# Patient Record
Sex: FEMALE | Race: WHITE | NOT HISPANIC OR LATINO | Employment: FULL TIME | URBAN - METROPOLITAN AREA
[De-identification: names, ages, dates, MRNs, and addresses within clinical notes are randomized per-mention and may not be internally consistent; named-entity substitution may affect disease eponyms.]

---

## 2019-01-06 ENCOUNTER — HOSPITAL ENCOUNTER (OUTPATIENT)
Dept: URGENT CARE | Facility: CLINIC | Age: 24
Discharge: HOME | End: 2019-01-06

## 2019-01-08 LAB — BACTERIA SPEC AEROBE CULT: NORMAL

## 2019-12-27 ENCOUNTER — HOSPITAL ENCOUNTER (OUTPATIENT)
Dept: URGENT CARE | Facility: CLINIC | Age: 24
Discharge: HOME | End: 2019-12-27
Attending: NURSE PRACTITIONER

## 2020-03-14 ENCOUNTER — HOSPITAL ENCOUNTER (OUTPATIENT)
Dept: URGENT CARE | Facility: CLINIC | Age: 25
Discharge: HOME | End: 2020-03-14

## 2020-03-16 LAB
BACTERIA SPEC AEROBE CULT: NORMAL
BACTERIA UR CULT: NORMAL

## 2020-07-20 ENCOUNTER — HOSPITAL ENCOUNTER (OUTPATIENT)
Dept: URGENT CARE | Facility: CLINIC | Age: 25
Discharge: HOME | End: 2020-07-20
Attending: PHYSICIAN ASSISTANT

## 2020-07-21 LAB — SARS-COV-2 RNA SPEC QL NAA+PROBE: NOT DETECTED

## 2020-12-28 ENCOUNTER — HOSPITAL ENCOUNTER (OUTPATIENT)
Dept: URGENT CARE | Facility: CLINIC | Age: 25
Discharge: HOME | End: 2020-12-28

## 2020-12-30 LAB — SARS-COV-2 RNA SPEC QL NAA+PROBE: NOT DETECTED

## 2020-12-31 LAB — BACTERIA SPEC AEROBE CULT: NORMAL

## 2021-06-16 ENCOUNTER — TRANSCRIBE ORDERS (OUTPATIENT)
Dept: ADMINISTRATIVE | Facility: HOSPITAL | Age: 26
End: 2021-06-16

## 2021-06-16 DIAGNOSIS — Z36.87 ENCOUNTER FOR ANTENATAL SCREENING FOR UNCERTAIN DATES: Primary | ICD-10-CM

## 2021-06-28 ENCOUNTER — APPOINTMENT (OUTPATIENT)
Dept: ULTRASOUND IMAGING | Facility: HOSPITAL | Age: 26
End: 2021-06-28

## 2022-03-12 PROCEDURE — U0004 COV-19 TEST NON-CDC HGH THRU: HCPCS | Performed by: EMERGENCY MEDICINE

## 2022-07-12 LAB
ALBUMIN SERPL-MCNC: 5.3 G/DL (ref 3.5–5.2)
ALBUMIN/GLOB SERPL: 1.8 G/DL
ALP SERPL-CCNC: 71 U/L (ref 39–117)
ALT SERPL W P-5'-P-CCNC: 22 U/L (ref 1–33)
ANION GAP SERPL CALCULATED.3IONS-SCNC: 12.8 MMOL/L (ref 5–15)
AST SERPL-CCNC: 18 U/L (ref 1–32)
BASOPHILS # BLD AUTO: 0.02 10*3/MM3 (ref 0–0.2)
BASOPHILS NFR BLD AUTO: 0.3 % (ref 0–1.5)
BILIRUB SERPL-MCNC: 1.5 MG/DL (ref 0–1.2)
BUN SERPL-MCNC: 15 MG/DL (ref 6–20)
BUN/CREAT SERPL: 18.1 (ref 7–25)
CALCIUM SPEC-SCNC: 10.1 MG/DL (ref 8.6–10.5)
CHLORIDE SERPL-SCNC: 99 MMOL/L (ref 98–107)
CO2 SERPL-SCNC: 24.2 MMOL/L (ref 22–29)
CREAT SERPL-MCNC: 0.83 MG/DL (ref 0.57–1)
DEPRECATED RDW RBC AUTO: 44.6 FL (ref 37–54)
EGFRCR SERPLBLD CKD-EPI 2021: 99.9 ML/MIN/1.73
EOSINOPHIL # BLD AUTO: 0.01 10*3/MM3 (ref 0–0.4)
EOSINOPHIL NFR BLD AUTO: 0.1 % (ref 0.3–6.2)
ERYTHROCYTE [DISTWIDTH] IN BLOOD BY AUTOMATED COUNT: 13.6 % (ref 12.3–15.4)
GLOBULIN UR ELPH-MCNC: 3 GM/DL
GLUCOSE SERPL-MCNC: 125 MG/DL (ref 65–99)
HCG INTACT+B SERPL-ACNC: <0.5 MIU/ML
HCT VFR BLD AUTO: 44.8 % (ref 34–46.6)
HGB BLD-MCNC: 15.1 G/DL (ref 12–15.9)
HOLD SPECIMEN: NORMAL
HOLD SPECIMEN: NORMAL
IMM GRANULOCYTES # BLD AUTO: 0.02 10*3/MM3 (ref 0–0.05)
IMM GRANULOCYTES NFR BLD AUTO: 0.3 % (ref 0–0.5)
LIPASE SERPL-CCNC: 20 U/L (ref 13–60)
LYMPHOCYTES # BLD AUTO: 0.82 10*3/MM3 (ref 0.7–3.1)
LYMPHOCYTES NFR BLD AUTO: 11.4 % (ref 19.6–45.3)
MCH RBC QN AUTO: 30 PG (ref 26.6–33)
MCHC RBC AUTO-ENTMCNC: 33.7 G/DL (ref 31.5–35.7)
MCV RBC AUTO: 89.1 FL (ref 79–97)
MONOCYTES # BLD AUTO: 0.76 10*3/MM3 (ref 0.1–0.9)
MONOCYTES NFR BLD AUTO: 10.5 % (ref 5–12)
NEUTROPHILS NFR BLD AUTO: 5.58 10*3/MM3 (ref 1.7–7)
NEUTROPHILS NFR BLD AUTO: 77.4 % (ref 42.7–76)
NRBC BLD AUTO-RTO: 0 /100 WBC (ref 0–0.2)
PLATELET # BLD AUTO: 170 10*3/MM3 (ref 140–450)
PMV BLD AUTO: 11.8 FL (ref 6–12)
POTASSIUM SERPL-SCNC: 3.5 MMOL/L (ref 3.5–5.2)
PROT SERPL-MCNC: 8.3 G/DL (ref 6–8.5)
RBC # BLD AUTO: 5.03 10*6/MM3 (ref 3.77–5.28)
SODIUM SERPL-SCNC: 136 MMOL/L (ref 136–145)
WBC NRBC COR # BLD: 7.21 10*3/MM3 (ref 3.4–10.8)
WHOLE BLOOD HOLD COAG: NORMAL
WHOLE BLOOD HOLD SPECIMEN: NORMAL

## 2022-07-12 PROCEDURE — 83690 ASSAY OF LIPASE: CPT

## 2022-07-12 PROCEDURE — 96372 THER/PROPH/DIAG INJ SC/IM: CPT

## 2022-07-12 PROCEDURE — 96374 THER/PROPH/DIAG INJ IV PUSH: CPT

## 2022-07-12 PROCEDURE — 84702 CHORIONIC GONADOTROPIN TEST: CPT

## 2022-07-12 PROCEDURE — 36415 COLL VENOUS BLD VENIPUNCTURE: CPT

## 2022-07-12 PROCEDURE — 85025 COMPLETE CBC W/AUTO DIFF WBC: CPT

## 2022-07-12 PROCEDURE — 96361 HYDRATE IV INFUSION ADD-ON: CPT

## 2022-07-12 PROCEDURE — 99283 EMERGENCY DEPT VISIT LOW MDM: CPT

## 2022-07-12 PROCEDURE — 80053 COMPREHEN METABOLIC PANEL: CPT

## 2022-07-12 RX ORDER — SODIUM CHLORIDE 0.9 % (FLUSH) 0.9 %
10 SYRINGE (ML) INJECTION AS NEEDED
Status: DISCONTINUED | OUTPATIENT
Start: 2022-07-12 | End: 2022-07-13 | Stop reason: HOSPADM

## 2022-07-12 NOTE — ED TRIAGE NOTES
Pt states she started feeling unwell on Saturday 7/9/22. She has had n/v/d/abdomenal pain since and it has progressively became worse.

## 2022-07-13 ENCOUNTER — HOSPITAL ENCOUNTER (EMERGENCY)
Facility: HOSPITAL | Age: 27
Discharge: HOME | End: 2022-07-13
Attending: EMERGENCY MEDICINE | Admitting: EMERGENCY MEDICINE

## 2022-07-13 ENCOUNTER — APPOINTMENT (OUTPATIENT)
Dept: CT IMAGING | Facility: HOSPITAL | Age: 27
End: 2022-07-13

## 2022-07-13 VITALS
HEIGHT: 63 IN | WEIGHT: 210.1 LBS | DIASTOLIC BLOOD PRESSURE: 50 MMHG | OXYGEN SATURATION: 96 % | RESPIRATION RATE: 18 BRPM | TEMPERATURE: 98.7 F | SYSTOLIC BLOOD PRESSURE: 91 MMHG | BODY MASS INDEX: 37.23 KG/M2 | HEART RATE: 81 BPM

## 2022-07-13 DIAGNOSIS — K52.9 GASTROENTERITIS: Primary | ICD-10-CM

## 2022-07-13 PROCEDURE — 96372 THER/PROPH/DIAG INJ SC/IM: CPT

## 2022-07-13 PROCEDURE — 74176 CT ABD & PELVIS W/O CONTRAST: CPT

## 2022-07-13 PROCEDURE — 96361 HYDRATE IV INFUSION ADD-ON: CPT

## 2022-07-13 PROCEDURE — 96374 THER/PROPH/DIAG INJ IV PUSH: CPT

## 2022-07-13 PROCEDURE — 25010000002 KETOROLAC TROMETHAMINE PER 15 MG: Performed by: NURSE PRACTITIONER

## 2022-07-13 PROCEDURE — 25010000002 DICYCLOMINE PER 20 MG: Performed by: NURSE PRACTITIONER

## 2022-07-13 RX ORDER — ACETAMINOPHEN 325 MG/1
650 TABLET ORAL ONCE
Status: COMPLETED | OUTPATIENT
Start: 2022-07-13 | End: 2022-07-13

## 2022-07-13 RX ORDER — ONDANSETRON 4 MG/1
4 TABLET, ORALLY DISINTEGRATING ORAL EVERY 8 HOURS PRN
Qty: 15 TABLET | Refills: 0 | Status: SHIPPED | OUTPATIENT
Start: 2022-07-13 | End: 2022-11-14

## 2022-07-13 RX ORDER — LOPERAMIDE HYDROCHLORIDE 2 MG/1
2 CAPSULE ORAL 4 TIMES DAILY PRN
Qty: 20 CAPSULE | Refills: 0 | Status: SHIPPED | OUTPATIENT
Start: 2022-07-13 | End: 2022-07-13 | Stop reason: SDUPTHER

## 2022-07-13 RX ORDER — DICYCLOMINE HYDROCHLORIDE 10 MG/ML
20 INJECTION INTRAMUSCULAR ONCE
Status: COMPLETED | OUTPATIENT
Start: 2022-07-13 | End: 2022-07-13

## 2022-07-13 RX ORDER — LOPERAMIDE HYDROCHLORIDE 2 MG/1
2 CAPSULE ORAL 4 TIMES DAILY PRN
Qty: 20 CAPSULE | Refills: 0 | Status: SHIPPED | OUTPATIENT
Start: 2022-07-13 | End: 2022-11-14

## 2022-07-13 RX ORDER — ONDANSETRON 4 MG/1
4 TABLET, ORALLY DISINTEGRATING ORAL EVERY 8 HOURS PRN
Qty: 15 TABLET | Refills: 0 | Status: SHIPPED | OUTPATIENT
Start: 2022-07-13 | End: 2022-07-13 | Stop reason: SDUPTHER

## 2022-07-13 RX ORDER — DICYCLOMINE HCL 20 MG
20 TABLET ORAL EVERY 8 HOURS PRN
Qty: 30 TABLET | Refills: 0 | Status: SHIPPED | OUTPATIENT
Start: 2022-07-13 | End: 2022-07-13 | Stop reason: SDUPTHER

## 2022-07-13 RX ORDER — DICYCLOMINE HCL 20 MG
20 TABLET ORAL EVERY 8 HOURS PRN
Qty: 30 TABLET | Refills: 0 | Status: SHIPPED | OUTPATIENT
Start: 2022-07-13 | End: 2022-11-14

## 2022-07-13 RX ORDER — KETOROLAC TROMETHAMINE 15 MG/ML
30 INJECTION, SOLUTION INTRAMUSCULAR; INTRAVENOUS ONCE
Status: COMPLETED | OUTPATIENT
Start: 2022-07-13 | End: 2022-07-13

## 2022-07-13 RX ADMIN — ACETAMINOPHEN 650 MG: 325 TABLET ORAL at 04:19

## 2022-07-13 RX ADMIN — DICYCLOMINE HYDROCHLORIDE 20 MG: 20 INJECTION, SOLUTION INTRAMUSCULAR at 04:19

## 2022-07-13 RX ADMIN — KETOROLAC TROMETHAMINE 30 MG: 15 INJECTION, SOLUTION INTRAMUSCULAR; INTRAVENOUS at 04:19

## 2022-07-13 RX ADMIN — SODIUM CHLORIDE 1000 ML: 9 INJECTION, SOLUTION INTRAVENOUS at 04:19

## 2022-07-13 NOTE — DISCHARGE INSTRUCTIONS
Your CT scan and other lab work was unremarkable.  Your symptoms may just be simply viral or they could possibly be food poisoning from contaminated food exposure    Take medications as prescribed for symptomatic treatment.    Clear liquids advance diet as tolerated.    Follow-up with your PCP if no better

## 2022-07-13 NOTE — ED PROVIDER NOTES
Time: 07:03 EDT  Arrived by: Vehicle  Chief Complaint: Nausea vomiting diarrhea  History provided by: Patient  History is limited by: N/A    History of Present Illness:  Patient is a 27 y.o. year old female that presents to the emergency department with nausea vomiting and diarrhea with abdominal pain      History provided by:  Patient  Diarrhea  The primary symptoms include fever, abdominal pain, nausea, vomiting ( Saturday only none since) and diarrhea. Primary symptoms do not include fatigue, melena, hematemesis, jaundice, hematochezia, dysuria, myalgias, arthralgias or rash. The illness began 3 to 5 days ago. The problem has not changed since onset.  The illness is also significant for tenesmus. The illness does not include chills, anorexia, dysphagia, odynophagia, bloating, constipation, back pain or itching. Risk factors: Symptoms started after she ate [employer] [employer] so thinks she had bad food exposure.   Abdominal Pain  Pain location:  Generalized  Pain quality: cramping and sharp    Pain radiates to:  Does not radiate  Pain severity:  Severe  Onset quality:  Gradual  Duration:  4 days  Timing:  Constant  Progression:  Waxing and waning  Chronicity:  New  Context: suspicious food intake ( Abdominal pain with diarrhea and vomiting started Saturday after eating [employer])    Relieved by:  Nothing  Worsened by:  Movement, palpation and eating  Ineffective treatments:  None tried  Associated symptoms: diarrhea, fever, nausea and vomiting ( Saturday only none since)    Associated symptoms: no anorexia, no chest pain, no chills, no constipation, no cough, no dysuria, no fatigue, no hematemesis, no hematochezia, no hematuria, no melena, no shortness of breath and no sore throat    Nausea  The primary symptoms include fever, abdominal pain, nausea, vomiting ( Saturday only none since) and diarrhea. Primary symptoms do not include fatigue, melena, hematemesis, jaundice, hematochezia, dysuria, myalgias, arthralgias or rash.    The illness is also significant for tenesmus. The illness does not include chills, anorexia, dysphagia, odynophagia, bloating, constipation, back pain or itching.   Vomiting  The primary symptoms include fever, abdominal pain, nausea, vomiting ( Saturday only none since) and diarrhea. Primary symptoms do not include fatigue, melena, hematemesis, jaundice, hematochezia, dysuria, myalgias, arthralgias or rash.   The illness is also significant for tenesmus. The illness does not include chills, anorexia, dysphagia, odynophagia, bloating, constipation, back pain or itching.       Similar Symptoms Previously: No    Recently seen: No      Patient Care Team  Primary Care Provider: None    Past Medical History:     Allergies   Allergen Reactions   • Oseltamivir Phosphate Other (See Comments)     hallucinations     No past medical history on file.  Past Surgical History:   Procedure Laterality Date   • CARDIAC SURGERY       No family history on file.    Home Medications:  Prior to Admission medications    Medication Sig Start Date End Date Taking? Authorizing Provider   acetaminophen (TYLENOL) 500 MG tablet Take 1 tablet by mouth Every 6 (Six) Hours As Needed for Mild Pain . 3/13/22   Shana Bell MD   amphetamine-dextroamphetamine (ADDERALL) 10 MG tablet Take 10 mg by mouth Daily.    Emergency, Nurse Epic, RN   aspirin 81 MG chewable tablet Chew 81 mg Daily.    Emergency, Nurse Epic, RN   brompheniramine-pseudoephedrine-DM 30-2-10 MG/5ML syrup Take 5 mL by mouth 4 (Four) Times a Day As Needed for Cough or Allergies. 3/12/22   Shana Bell MD   cefdinir (OMNICEF) 300 MG capsule Take 1 capsule by mouth 2 (Two) Times a Day. 3/13/22   Shana Bell MD   cetirizine (zyrTEC) 10 MG tablet Take 1 tablet by mouth Daily. 3/13/22   Shana Bell MD   methylPREDNISolone (MEDROL) 4 MG dose pack Take as directed on package instructions. 3/13/22   Shana Bell MD        Social History:   PT  reports that she has never  "smoked. She has never used smokeless tobacco. She reports current alcohol use. She reports that she does not use drugs.    Record Review:  I have reviewed the patient's records in [facility].     Review of Systems  Review of Systems   Constitutional: Positive for fever. Negative for chills and fatigue.   HENT: Negative for congestion, ear pain and sore throat.    Eyes: Negative for pain.   Respiratory: Negative for cough, chest tightness and shortness of breath.    Cardiovascular: Negative for chest pain.   Gastrointestinal: Positive for abdominal pain, diarrhea, nausea, rectal pain ( Burning from having diarrhea) and vomiting ( Saturday only none since). Negative for anal bleeding, anorexia, bloating, blood in stool, constipation, dysphagia, hematemesis, hematochezia, jaundice and melena.   Genitourinary: Negative for dysuria, flank pain and hematuria.   Musculoskeletal: Negative for arthralgias, back pain, joint swelling and myalgias.   Skin: Negative for itching, pallor and rash.   Neurological: Negative for seizures and headaches.   Hematological: Negative.    Psychiatric/Behavioral: Negative.    All other systems reviewed and are negative.       Physical Exam  BP 91/50   Pulse 81   Temp 98.7 °F (37.1 °C)   Resp 18   Ht 160 cm (63\")   Wt 95.3 kg (210 lb 1.6 oz)   LMP 06/21/2022 (Approximate)   SpO2 96%   BMI 37.22 kg/m²     Physical Exam  Vitals and nursing note reviewed.   Constitutional:       General: She is not in acute distress.     Appearance: Normal appearance. She is not toxic-appearing.   HENT:      Head: Normocephalic and atraumatic.      Mouth/Throat:      Mouth: Mucous membranes are moist.   Eyes:      General: No scleral icterus.  Cardiovascular:      Rate and Rhythm: Regular rhythm. Tachycardia present.      Pulses: Normal pulses.      Heart sounds: Normal heart sounds.   Pulmonary:      Effort: Pulmonary effort is normal. No respiratory distress.      Breath sounds: Normal breath sounds. " "  Abdominal:      General: Bowel sounds are normal.      Palpations: Abdomen is soft.      Tenderness: There is abdominal tenderness in the right lower quadrant, suprapubic area and left lower quadrant. There is no right CVA tenderness or left CVA tenderness.      Hernia: No hernia is present.   Musculoskeletal:         General: Normal range of motion.      Cervical back: Normal range of motion and neck supple.   Skin:     General: Skin is warm and dry.   Neurological:      Mental Status: She is alert and oriented to person, place, and time. Mental status is at baseline.   Psychiatric:         Mood and Affect: Mood normal.         Behavior: Behavior normal.          ED Course  BP 91/50   Pulse 81   Temp 98.7 °F (37.1 °C)   Resp 18   Ht 160 cm (63\")   Wt 95.3 kg (210 lb 1.6 oz)   LMP 06/21/2022 (Approximate)   SpO2 96%   BMI 37.22 kg/m²   Results for orders placed or performed during the hospital encounter of 07/13/22   Comprehensive Metabolic Panel    Specimen: Blood   Result Value Ref Range    Glucose 125 (H) 65 - 99 mg/dL    BUN 15 6 - 20 mg/dL    Creatinine 0.83 0.57 - 1.00 mg/dL    Sodium 136 136 - 145 mmol/L    Potassium 3.5 3.5 - 5.2 mmol/L    Chloride 99 98 - 107 mmol/L    CO2 24.2 22.0 - 29.0 mmol/L    Calcium 10.1 8.6 - 10.5 mg/dL    Total Protein 8.3 6.0 - 8.5 g/dL    Albumin 5.30 (H) 3.50 - 5.20 g/dL    ALT (SGPT) 22 1 - 33 U/L    AST (SGOT) 18 1 - 32 U/L    Alkaline Phosphatase 71 39 - 117 U/L    Total Bilirubin 1.5 (H) 0.0 - 1.2 mg/dL    Globulin 3.0 gm/dL    A/G Ratio 1.8 g/dL    BUN/Creatinine Ratio 18.1 7.0 - 25.0    Anion Gap 12.8 5.0 - 15.0 mmol/L    eGFR 99.9 >60.0 mL/min/1.73   Lipase    Specimen: Blood   Result Value Ref Range    Lipase 20 13 - 60 U/L   hCG, Quantitative, Pregnancy    Specimen: Blood   Result Value Ref Range    HCG Quantitative <0.50 mIU/mL   CBC Auto Differential    Specimen: Blood   Result Value Ref Range    WBC 7.21 3.40 - 10.80 10*3/mm3    RBC 5.03 3.77 - 5.28 " 10*6/mm3    Hemoglobin 15.1 12.0 - 15.9 g/dL    Hematocrit 44.8 34.0 - 46.6 %    MCV 89.1 79.0 - 97.0 fL    MCH 30.0 26.6 - 33.0 pg    MCHC 33.7 31.5 - 35.7 g/dL    RDW 13.6 12.3 - 15.4 %    RDW-SD 44.6 37.0 - 54.0 fl    MPV 11.8 6.0 - 12.0 fL    Platelets 170 140 - 450 10*3/mm3    Neutrophil % 77.4 (H) 42.7 - 76.0 %    Lymphocyte % 11.4 (L) 19.6 - 45.3 %    Monocyte % 10.5 5.0 - 12.0 %    Eosinophil % 0.1 (L) 0.3 - 6.2 %    Basophil % 0.3 0.0 - 1.5 %    Immature Grans % 0.3 0.0 - 0.5 %    Neutrophils, Absolute 5.58 1.70 - 7.00 10*3/mm3    Lymphocytes, Absolute 0.82 0.70 - 3.10 10*3/mm3    Monocytes, Absolute 0.76 0.10 - 0.90 10*3/mm3    Eosinophils, Absolute 0.01 0.00 - 0.40 10*3/mm3    Basophils, Absolute 0.02 0.00 - 0.20 10*3/mm3    Immature Grans, Absolute 0.02 0.00 - 0.05 10*3/mm3    nRBC 0.0 0.0 - 0.2 /100 WBC   Green Top (Gel)   Result Value Ref Range    Extra Tube Hold for add-ons.    Lavender Top   Result Value Ref Range    Extra Tube hold for add-on    Gold Top - SST   Result Value Ref Range    Extra Tube Hold for add-ons.    Light Blue Top   Result Value Ref Range    Extra Tube Hold for add-ons.      Medications   sodium chloride 0.9 % flush 10 mL (has no administration in time range)   ketorolac (TORADOL) injection 30 mg (30 mg Intravenous Given 7/13/22 0419)   sodium chloride 0.9 % bolus 1,000 mL (0 mL Intravenous Stopped 7/13/22 0526)   dicyclomine (BENTYL) injection 20 mg (20 mg Intramuscular Given 7/13/22 0419)   acetaminophen (TYLENOL) tablet 650 mg (650 mg Oral Given 7/13/22 0419)     CT Abdomen Pelvis Without Contrast    Result Date: 7/13/2022  Narrative: PROCEDURE: CT ABDOMEN PELVIS WO CONTRAST  COMPARISON: None.  INDICATIONS: PAIN ACROSS LOWER ABDOMEN AND DIARRHEA.  TECHNIQUE: 706 CT images were created without intravenous contrast.   PROTOCOL:   Standard CT imaging protocol performed.    RADIATION:   Total DLP: 730.4mGy*cm   Automated exposure control was utilized to minimize radiation dose.   FINDINGS:  An IVC endovascular stent is identified.  Please correlate with the prior surgical history.  No acute cholecystitis or pancreatitis.  No gallstones.  No biliary ductal dilatation.  No mechanical bowel obstruction.  No pathologic bowel wall thickening.  No pneumoperitoneum or pneumatosis.  No portal or mesenteric venous gas.  The appendix is within normal limits, well seen on coronal image 50 of series 202 and adjacent images.  No acute colitis or diverticulitis.  No renal/ureteral stones or hydronephrosis is seen.  No obstructive uropathy is identified.  No ascites.  No aneurysmal dilatation of the aortoiliac arterial system.  No acute intraperitoneal or retroperitoneal hemorrhage.  No enlarged intra-abdominal or intrapelvic lymph nodes.  No adrenal mass.  No acute findings are seen with regard to the liver or spleen.  No acute fracture.  No aggressive osseous lesion.  There are sclerotic changes of the bilateral hip joints.  No acute infiltrate is seen in the partially imaged lung bases.  No suspicious uterine or adnexal mass is seen by nonenhanced CT examination.  No urinary bladder wall thickening or urinary bladder calculi.  No acute findings are seen by nonenhanced CT examination.      Impression:   No acute findings are seen in the abdomen or pelvis by nonenhanced CT examination.     COMMENT:  Part of this note is an electronic transcription of spoken language to printed text. The electronic translation/transcription may permit erroneous, or at times, nonsensical (or even sensical) words or phrases to be inadvertently transcribed or omitted; this  has reviewed the note for such errors (as well as additional errors); however, some may still exist.  TERESSA HATHAWAY JR, MD       Electronically Signed and Approved By: TERESSA HATHAWAY JR, MD on 7/13/2022 at 4:12                 Medical Decision Making:                     MDM  Number of Diagnoses or Management Options  Gastroenteritis  Diagnosis  management comments: The patient presents with vomiting and diarrhea consistent with gastroenteritis. The patient has a soft and benign abdominal exam. The patient is now resting comfortably and feels better, is alert, and is in no distress. The patient is able to tolerate po intake in the ED. The patient´s labs were reviewed and hydration status was assessed. The patient has no signs of acute renal failure, sepsis, or dehydration that warrants admission to the hospital. The vital signs have been stable. The patient's condition is stable and appropriate for discharge. The patient will pursue further outpatient evaluation with the primary care physician or designated physician. The patient and/or caregivers have expressed a clear and thorough understanding and agreed to follow-up as instructed.         Amount and/or Complexity of Data Reviewed  Clinical lab tests: reviewed and ordered  Tests in the radiology section of CPT®: reviewed and ordered  Tests in the medicine section of CPT®: ordered and reviewed    Risk of Complications, Morbidity, and/or Mortality  Presenting problems: moderate  Diagnostic procedures: moderate  Management options: low    Patient Progress  Patient progress: stable       Final diagnoses:   Gastroenteritis        Disposition:  ED Disposition     ED Disposition   Discharge    Condition   Stable    Comment   --              Rohini Monson, APRN  07/13/22 0703

## 2022-07-15 ENCOUNTER — OFFICE VISIT (OUTPATIENT)
Dept: INTERNAL MEDICINE | Facility: CLINIC | Age: 27
End: 2022-07-15

## 2022-07-15 VITALS
TEMPERATURE: 98.2 F | HEIGHT: 63 IN | SYSTOLIC BLOOD PRESSURE: 126 MMHG | HEART RATE: 100 BPM | DIASTOLIC BLOOD PRESSURE: 80 MMHG | BODY MASS INDEX: 37.92 KG/M2 | OXYGEN SATURATION: 100 % | WEIGHT: 214 LBS

## 2022-07-15 DIAGNOSIS — R19.7 DIARRHEA, UNSPECIFIED TYPE: ICD-10-CM

## 2022-07-15 DIAGNOSIS — J02.9 ACUTE PHARYNGITIS, UNSPECIFIED ETIOLOGY: Primary | ICD-10-CM

## 2022-07-15 DIAGNOSIS — K64.8 OTHER HEMORRHOIDS: ICD-10-CM

## 2022-07-15 PROBLEM — E88.81 INSULIN RESISTANCE: Status: ACTIVE | Noted: 2022-06-17

## 2022-07-15 PROBLEM — E88.819 INSULIN RESISTANCE: Status: ACTIVE | Noted: 2022-06-17

## 2022-07-15 LAB
EXPIRATION DATE: NORMAL
EXPIRATION DATE: NORMAL
FLUAV AG UPPER RESP QL IA.RAPID: NOT DETECTED
FLUBV AG UPPER RESP QL IA.RAPID: NOT DETECTED
INTERNAL CONTROL: NORMAL
INTERNAL CONTROL: NORMAL
Lab: NORMAL
Lab: NORMAL
S PYO AG THROAT QL: NEGATIVE
SARS-COV-2 AG UPPER RESP QL IA.RAPID: NOT DETECTED

## 2022-07-15 PROCEDURE — 87428 SARSCOV & INF VIR A&B AG IA: CPT | Performed by: NURSE PRACTITIONER

## 2022-07-15 PROCEDURE — 87880 STREP A ASSAY W/OPTIC: CPT | Performed by: NURSE PRACTITIONER

## 2022-07-15 PROCEDURE — 99204 OFFICE O/P NEW MOD 45 MIN: CPT | Performed by: NURSE PRACTITIONER

## 2022-07-15 RX ORDER — BUPROPION HYDROCHLORIDE 300 MG/1
1 TABLET ORAL DAILY
COMMUNITY
Start: 2022-05-20 | End: 2022-11-14

## 2022-07-15 RX ORDER — SACCHAROMYCES BOULARDII 250 MG
250 CAPSULE ORAL 2 TIMES DAILY
Qty: 60 CAPSULE | Refills: 0 | Status: SHIPPED | OUTPATIENT
Start: 2022-07-15 | End: 2022-11-14

## 2022-07-15 RX ORDER — HYDROCORTISONE 25 MG/G
CREAM TOPICAL 2 TIMES DAILY
Qty: 30 G | Refills: 1 | Status: SHIPPED | OUTPATIENT
Start: 2022-07-15 | End: 2022-11-14

## 2022-07-15 RX ORDER — BUPROPION HYDROCHLORIDE 150 MG/1
1 TABLET ORAL DAILY
COMMUNITY
Start: 2022-05-20 | End: 2022-11-14

## 2022-07-15 RX ORDER — AMOXICILLIN 500 MG/1
500 TABLET, FILM COATED ORAL 2 TIMES DAILY
Qty: 20 TABLET | Refills: 0 | Status: SHIPPED | OUTPATIENT
Start: 2022-07-15 | End: 2022-07-25

## 2022-07-15 NOTE — PROGRESS NOTES
"Chief Complaint  Establish Care (No previous PCP) and Diarrhea (Patient has been having diarrhea for over a week. Patient states it feels like acid. Patient believes there and infection there. Patient has had chills, fever, and cough. Patient also feel like there is something stuck there. Patient did experience some blood in her stool and yellow pus when wiping.)  Subjective        History of Present Illness  Stephanie Slaughter is a 27 y.o. female who presents to [facility] INTERNAL MEDICINE:    1.  To establish primary care.    2.  Complaint of diarrhea for 1 week.  She was seen in the emergency department on 2022 for gastroenteritis.  She reports having watery diarrhea for 1 week.  With abdominal cramping.  She is having rectal burning with bowel movements.  She has had blood and \"yellow pus\" in her bowel movements.  She was having nausea and vomiting but that has subsided over the last 2 days. Positive for fever, chills, cough, and sore throat.      Past Medical History:   Diagnosis Date   • ADHD (attention deficit hyperactivity disorder)    • Anxiety    • Depression    • Heart abnormality         Past Surgical History:   Procedure Laterality Date   • CARDIAC SURGERY     •  SECTION     • EYE SURGERY     • WISDOM TOOTH EXTRACTION          Allergies   Allergen Reactions   • Oseltamivir Phosphate Other (See Comments)     \"tamiflu\"  hallucinations          Current Outpatient Medications:   •  amphetamine-dextroamphetamine (ADDERALL) 10 MG tablet, Take 10 mg by mouth Daily., Disp: , Rfl:   •  aspirin 81 MG chewable tablet, Chew 81 mg Daily., Disp: , Rfl:   •  buPROPion XL (WELLBUTRIN XL) 150 MG 24 hr tablet, Take 1 tablet by mouth Daily. Patient does not always take this medication because of how it makes her feel., Disp: , Rfl:   •  buPROPion XL (WELLBUTRIN XL) 300 MG 24 hr tablet, Take 1 tablet by mouth Daily. Patient does not always take this medication because of how it makes her feel., " "Disp: , Rfl:   •  dicyclomine (BENTYL) 20 MG tablet, Take 1 tablet by mouth Every 8 (Eight) Hours As Needed (abdominal pain)., Disp: 30 tablet, Rfl: 0  •  loperamide (IMODIUM) 2 MG capsule, Take 1 capsule by mouth 4 (Four) Times a Day As Needed for Diarrhea., Disp: 20 capsule, Rfl: 0  •  ondansetron ODT (ZOFRAN-ODT) 4 MG disintegrating tablet, Place 1 tablet on the tongue Every 8 (Eight) Hours As Needed for Nausea or Vomiting., Disp: 15 tablet, Rfl: 0  •  amoxicillin (AMOXIL) 500 MG tablet, Take 1 tablet by mouth 2 (Two) Times a Day for 10 days. Complete all of this medication., Disp: 20 tablet, Rfl: 0  •  Hydrocortisone, Perianal, (Procto-Med HC) 2.5 % rectal cream, Insert  into the rectum 2 (Two) Times a Day., Disp: 30 g, Rfl: 1  •  saccharomyces boulardii (Florastor) 250 MG capsule, Take 1 capsule by mouth 2 (Two) Times a Day., Disp: 60 capsule, Rfl: 0    Objective   /80 (BP Location: Left arm, Patient Position: Sitting, Cuff Size: Adult)   Pulse 100   Temp 98.2 °F (36.8 °C) (Temporal)   Ht 160 cm (63\")   Wt 97.1 kg (214 lb)   LMP 06/21/2022 (Approximate)   SpO2 100%   BMI 37.91 kg/m²    Estimated body mass index is 37.91 kg/m² as calculated from the following:    Height as of this encounter: 160 cm (63\").    Weight as of this encounter: 97.1 kg (214 lb).   Physical Exam  Vitals reviewed.   Constitutional:       General: She is not in acute distress.     Appearance: Normal appearance.   HENT:      Head: Normocephalic and atraumatic.      Right Ear: Tympanic membrane and ear canal normal.      Left Ear: Tympanic membrane and ear canal normal.      Mouth/Throat:      Mouth: Mucous membranes are moist.      Pharynx: Oropharyngeal exudate and posterior oropharyngeal erythema present.   Eyes:      Conjunctiva/sclera: Conjunctivae normal.   Cardiovascular:      Rate and Rhythm: Normal rate and regular rhythm.      Heart sounds: Normal heart sounds.   Pulmonary:      Effort: Pulmonary effort is normal.      " Breath sounds: Normal breath sounds. No wheezing, rhonchi or rales.   Abdominal:      Palpations: Abdomen is soft. There is no mass.      Tenderness: There is abdominal tenderness in the epigastric area and left lower quadrant.   Lymphadenopathy:      Cervical: No cervical adenopathy.   Skin:     General: Skin is warm and dry.   Neurological:      General: No focal deficit present.      Mental Status: She is alert.   Psychiatric:         Thought Content: Thought content normal.        Result Review :                   Assessment and Plan    Diagnoses and all orders for this visit:    1. Acute pharyngitis, unspecified etiology (Primary)  -     POCT SARS-CoV-2 Antigen VINAYAK  -     POC Rapid Strep A    2. Diarrhea, unspecified type  -     Hydrocortisone, Perianal, (Procto-Med HC) 2.5 % rectal cream; Insert  into the rectum 2 (Two) Times a Day.  Dispense: 30 g; Refill: 1  -     Clostridium Difficile EIA - Stool, Per Rectum; Future  -     Stool Culture (Reference Lab) - Stool, Per Rectum; Future  -     Sedimentation Rate; Future  -     Occult Blood X 1, Stool - Stool, Per Rectum; Future    3. Other hemorrhoids    Other orders  -     amoxicillin (AMOXIL) 500 MG tablet; Take 1 tablet by mouth 2 (Two) Times a Day for 10 days. Complete all of this medication.  Dispense: 20 tablet; Refill: 0  -     saccharomyces boulardii (Florastor) 250 MG capsule; Take 1 capsule by mouth 2 (Two) Times a Day.  Dispense: 60 capsule; Refill: 0    POCT strep, Covid and flu are negative today. Treat for acute  bacterial pharyngitis due to symptoms. Will treat for hemorrhoids also based on symptoms but also advised to use Aquaphor or Vaseline for rectal irritation due to diarrhea. Dietary avoidances discussed.  If diarrhea other symptoms not improving she was instructed to do stool studies. Education on diagnosis, medication, and treatment plan. The patient understands and agrees to this plan.    Follow Up     Patient was given instructions and  counseling regarding her condition. Please see specific information pulled into the AVS if appropriate.   Return if symptoms worsen or fail to improve.    LELE Moreira

## 2022-08-02 ENCOUNTER — APPOINTMENT (OUTPATIENT)
Dept: ULTRASOUND IMAGING | Facility: HOSPITAL | Age: 27
End: 2022-08-02

## 2022-08-02 ENCOUNTER — HOSPITAL ENCOUNTER (EMERGENCY)
Facility: HOSPITAL | Age: 27
Discharge: HOME | End: 2022-08-02
Attending: EMERGENCY MEDICINE | Admitting: EMERGENCY MEDICINE

## 2022-08-02 ENCOUNTER — OFFICE VISIT (OUTPATIENT)
Dept: INTERNAL MEDICINE | Facility: CLINIC | Age: 27
End: 2022-08-02

## 2022-08-02 ENCOUNTER — APPOINTMENT (OUTPATIENT)
Dept: CT IMAGING | Facility: HOSPITAL | Age: 27
End: 2022-08-02

## 2022-08-02 VITALS
BODY MASS INDEX: 36.29 KG/M2 | SYSTOLIC BLOOD PRESSURE: 100 MMHG | HEART RATE: 86 BPM | DIASTOLIC BLOOD PRESSURE: 70 MMHG | WEIGHT: 204.8 LBS | TEMPERATURE: 98.7 F | HEIGHT: 63 IN | OXYGEN SATURATION: 95 %

## 2022-08-02 VITALS
SYSTOLIC BLOOD PRESSURE: 123 MMHG | WEIGHT: 200 LBS | BODY MASS INDEX: 35.44 KG/M2 | RESPIRATION RATE: 20 BRPM | TEMPERATURE: 97.9 F | OXYGEN SATURATION: 95 % | HEART RATE: 90 BPM | HEIGHT: 63 IN | DIASTOLIC BLOOD PRESSURE: 87 MMHG

## 2022-08-02 DIAGNOSIS — R10.31 ACUTE RIGHT LOWER QUADRANT PAIN: Primary | ICD-10-CM

## 2022-08-02 DIAGNOSIS — R10.31 RIGHT LOWER QUADRANT ABDOMINAL PAIN: Primary | ICD-10-CM

## 2022-08-02 LAB
ALBUMIN SERPL-MCNC: 5.5 G/DL (ref 3.5–5.2)
ALBUMIN/GLOB SERPL: 1.7 G/DL
ALP SERPL-CCNC: 76 U/L (ref 39–117)
ALT SERPL W P-5'-P-CCNC: 16 U/L (ref 1–33)
ANION GAP SERPL CALCULATED.3IONS-SCNC: 12.6 MMOL/L (ref 5–15)
AST SERPL-CCNC: 15 U/L (ref 1–32)
BACTERIA UR QL AUTO: ABNORMAL /HPF
BASOPHILS # BLD AUTO: 0.05 10*3/MM3 (ref 0–0.2)
BASOPHILS NFR BLD AUTO: 0.6 % (ref 0–1.5)
BILIRUB SERPL-MCNC: 1.4 MG/DL (ref 0–1.2)
BILIRUB UR QL STRIP: NEGATIVE
BUN SERPL-MCNC: 12 MG/DL (ref 6–20)
BUN/CREAT SERPL: 16.7 (ref 7–25)
CALCIUM SPEC-SCNC: 10.9 MG/DL (ref 8.6–10.5)
CHLORIDE SERPL-SCNC: 102 MMOL/L (ref 98–107)
CLARITY UR: CLEAR
CO2 SERPL-SCNC: 24.4 MMOL/L (ref 22–29)
COLOR UR: YELLOW
CREAT SERPL-MCNC: 0.72 MG/DL (ref 0.57–1)
DEPRECATED RDW RBC AUTO: 43.9 FL (ref 37–54)
EGFRCR SERPLBLD CKD-EPI 2021: 118.4 ML/MIN/1.73
EOSINOPHIL # BLD AUTO: 0.04 10*3/MM3 (ref 0–0.4)
EOSINOPHIL NFR BLD AUTO: 0.4 % (ref 0.3–6.2)
ERYTHROCYTE [DISTWIDTH] IN BLOOD BY AUTOMATED COUNT: 13.6 % (ref 12.3–15.4)
GLOBULIN UR ELPH-MCNC: 3.2 GM/DL
GLUCOSE SERPL-MCNC: 96 MG/DL (ref 65–99)
GLUCOSE UR STRIP-MCNC: NEGATIVE MG/DL
HCG INTACT+B SERPL-ACNC: <0.5 MIU/ML
HCT VFR BLD AUTO: 43.6 % (ref 34–46.6)
HGB BLD-MCNC: 14.9 G/DL (ref 12–15.9)
HGB UR QL STRIP.AUTO: NEGATIVE
HOLD SPECIMEN: NORMAL
HOLD SPECIMEN: NORMAL
HYALINE CASTS UR QL AUTO: ABNORMAL /LPF
IMM GRANULOCYTES # BLD AUTO: 0.02 10*3/MM3 (ref 0–0.05)
IMM GRANULOCYTES NFR BLD AUTO: 0.2 % (ref 0–0.5)
KETONES UR QL STRIP: NEGATIVE
LEUKOCYTE ESTERASE UR QL STRIP.AUTO: ABNORMAL
LIPASE SERPL-CCNC: 21 U/L (ref 13–60)
LYMPHOCYTES # BLD AUTO: 1.23 10*3/MM3 (ref 0.7–3.1)
LYMPHOCYTES NFR BLD AUTO: 13.8 % (ref 19.6–45.3)
MCH RBC QN AUTO: 30 PG (ref 26.6–33)
MCHC RBC AUTO-ENTMCNC: 34.2 G/DL (ref 31.5–35.7)
MCV RBC AUTO: 87.9 FL (ref 79–97)
MONOCYTES # BLD AUTO: 0.63 10*3/MM3 (ref 0.1–0.9)
MONOCYTES NFR BLD AUTO: 7.1 % (ref 5–12)
NEUTROPHILS NFR BLD AUTO: 6.92 10*3/MM3 (ref 1.7–7)
NEUTROPHILS NFR BLD AUTO: 77.9 % (ref 42.7–76)
NITRITE UR QL STRIP: NEGATIVE
NRBC BLD AUTO-RTO: 0 /100 WBC (ref 0–0.2)
PH UR STRIP.AUTO: 7.5 [PH] (ref 5–8)
PLATELET # BLD AUTO: 229 10*3/MM3 (ref 140–450)
PMV BLD AUTO: 11.7 FL (ref 6–12)
POTASSIUM SERPL-SCNC: 3.8 MMOL/L (ref 3.5–5.2)
PROT SERPL-MCNC: 8.7 G/DL (ref 6–8.5)
PROT UR QL STRIP: NEGATIVE
RBC # BLD AUTO: 4.96 10*6/MM3 (ref 3.77–5.28)
RBC # UR STRIP: ABNORMAL /HPF
REF LAB TEST METHOD: ABNORMAL
SODIUM SERPL-SCNC: 139 MMOL/L (ref 136–145)
SP GR UR STRIP: >=1.03 (ref 1–1.03)
SQUAMOUS #/AREA URNS HPF: ABNORMAL /HPF
UROBILINOGEN UR QL STRIP: ABNORMAL
WBC # UR STRIP: ABNORMAL /HPF
WBC NRBC COR # BLD: 8.89 10*3/MM3 (ref 3.4–10.8)
WHOLE BLOOD HOLD COAG: NORMAL
WHOLE BLOOD HOLD SPECIMEN: NORMAL

## 2022-08-02 PROCEDURE — 76830 TRANSVAGINAL US NON-OB: CPT

## 2022-08-02 PROCEDURE — 96375 TX/PRO/DX INJ NEW DRUG ADDON: CPT

## 2022-08-02 PROCEDURE — 80053 COMPREHEN METABOLIC PANEL: CPT | Performed by: EMERGENCY MEDICINE

## 2022-08-02 PROCEDURE — 25010000002 ONDANSETRON PER 1 MG: Performed by: EMERGENCY MEDICINE

## 2022-08-02 PROCEDURE — 99283 EMERGENCY DEPT VISIT LOW MDM: CPT

## 2022-08-02 PROCEDURE — 0 IOPAMIDOL PER 1 ML: Performed by: EMERGENCY MEDICINE

## 2022-08-02 PROCEDURE — 83690 ASSAY OF LIPASE: CPT | Performed by: EMERGENCY MEDICINE

## 2022-08-02 PROCEDURE — 99214 OFFICE O/P EST MOD 30 MIN: CPT | Performed by: NURSE PRACTITIONER

## 2022-08-02 PROCEDURE — 81001 URINALYSIS AUTO W/SCOPE: CPT | Performed by: EMERGENCY MEDICINE

## 2022-08-02 PROCEDURE — 96374 THER/PROPH/DIAG INJ IV PUSH: CPT

## 2022-08-02 PROCEDURE — 96376 TX/PRO/DX INJ SAME DRUG ADON: CPT

## 2022-08-02 PROCEDURE — 84702 CHORIONIC GONADOTROPIN TEST: CPT | Performed by: EMERGENCY MEDICINE

## 2022-08-02 PROCEDURE — 74177 CT ABD & PELVIS W/CONTRAST: CPT

## 2022-08-02 PROCEDURE — 25010000002 HYDROMORPHONE 1 MG/ML SOLUTION: Performed by: EMERGENCY MEDICINE

## 2022-08-02 PROCEDURE — 85025 COMPLETE CBC W/AUTO DIFF WBC: CPT | Performed by: EMERGENCY MEDICINE

## 2022-08-02 RX ORDER — ONDANSETRON 4 MG/1
4 TABLET, ORALLY DISINTEGRATING ORAL EVERY 8 HOURS PRN
Qty: 14 TABLET | Refills: 0 | Status: SHIPPED | OUTPATIENT
Start: 2022-08-02 | End: 2022-11-14

## 2022-08-02 RX ORDER — SODIUM CHLORIDE 0.9 % (FLUSH) 0.9 %
10 SYRINGE (ML) INJECTION AS NEEDED
Status: DISCONTINUED | OUTPATIENT
Start: 2022-08-02 | End: 2022-08-02 | Stop reason: HOSPADM

## 2022-08-02 RX ORDER — ONDANSETRON 2 MG/ML
4 INJECTION INTRAMUSCULAR; INTRAVENOUS ONCE
Status: COMPLETED | OUTPATIENT
Start: 2022-08-02 | End: 2022-08-02

## 2022-08-02 RX ORDER — HYDROCODONE BITARTRATE AND ACETAMINOPHEN 5; 325 MG/1; MG/1
1 TABLET ORAL EVERY 6 HOURS PRN
Qty: 8 TABLET | Refills: 0 | Status: SHIPPED | OUTPATIENT
Start: 2022-08-02 | End: 2022-11-14

## 2022-08-02 RX ADMIN — IOPAMIDOL 100 ML: 755 INJECTION, SOLUTION INTRAVENOUS at 14:59

## 2022-08-02 RX ADMIN — HYDROMORPHONE HYDROCHLORIDE 1 MG: 1 INJECTION, SOLUTION INTRAMUSCULAR; INTRAVENOUS; SUBCUTANEOUS at 15:18

## 2022-08-02 RX ADMIN — ONDANSETRON 4 MG: 2 INJECTION INTRAMUSCULAR; INTRAVENOUS at 14:10

## 2022-08-02 RX ADMIN — HYDROMORPHONE HYDROCHLORIDE 1 MG: 1 INJECTION, SOLUTION INTRAMUSCULAR; INTRAVENOUS; SUBCUTANEOUS at 14:10

## 2022-08-02 NOTE — ED PROVIDER NOTES
"Time: 1:33 PM EDT  Arrived by: private car  Chief Complaint: abdominal pain  History provided by: patient  History is limited by: none    History of Present Illness:  Patient is a 27 y.o. year old female who presents to the emergency department with abdominal pain.    Patient arrives to ED via private car. Patient has a medical history of ADHD, anxiety, depression, and heart abnormality. Patient has no history of smoking, but is a current alcohol user, but no history of drug use.    Patient started having right lower abdominal pain last night but worsened significantly today (2022). Patient confirms symptoms of nausea, vomiting, diarrhea. Patient is mensturating at this time and no chance of pregnancy due to tubal litigation procedure. Patient still has her appendix at this time.       History provided by:  Patient   used: No        Similar Symptoms Previously: none  Recently seen: none      Patient Care Team  Primary Care Provider: Lakeshia Cam APRN    Past Medical History:     Allergies   Allergen Reactions   • Oseltamivir Phosphate Other (See Comments)     \"tamiflu\"  hallucinations     Past Medical History:   Diagnosis Date   • ADHD (attention deficit hyperactivity disorder)    • Anxiety    • Depression    • Heart abnormality      Past Surgical History:   Procedure Laterality Date   • CARDIAC SURGERY     •  SECTION     • EYE SURGERY     • WISDOM TOOTH EXTRACTION       No family history on file.    Home Medications:  Prior to Admission medications    Medication Sig Start Date End Date Taking? Authorizing Provider   amphetamine-dextroamphetamine (ADDERALL) 10 MG tablet Take 10 mg by mouth Daily.    Emergency, Nurse Epic, RN   aspirin 81 MG chewable tablet Chew 81 mg Daily.    Emergency, Nurse Judi RN   buPROPion XL (WELLBUTRIN XL) 150 MG 24 hr tablet Take 1 tablet by mouth Daily. Patient does not always take this medication because of how it makes her feel. 22   " Provider, MD Mine   buPROPion XL (WELLBUTRIN XL) 300 MG 24 hr tablet Take 1 tablet by mouth Daily. Patient does not always take this medication because of how it makes her feel. 5/20/22   ProviderMine MD   dicyclomine (BENTYL) 20 MG tablet Take 1 tablet by mouth Every 8 (Eight) Hours As Needed (abdominal pain). 7/13/22   Rohini Monson APRN   Hydrocortisone, Perianal, (Procto-Med HC) 2.5 % rectal cream Insert  into the rectum 2 (Two) Times a Day. 7/15/22   Lakeshia Cam APRN   loperamide (IMODIUM) 2 MG capsule Take 1 capsule by mouth 4 (Four) Times a Day As Needed for Diarrhea. 7/13/22   oRhini Monson APRN   ondansetron ODT (ZOFRAN-ODT) 4 MG disintegrating tablet Place 1 tablet on the tongue Every 8 (Eight) Hours As Needed for Nausea or Vomiting. 7/13/22   Rohini Monson APRN   saccharomyces boulardii (Florastor) 250 MG capsule Take 1 capsule by mouth 2 (Two) Times a Day. 7/15/22   Lakeshia Cam APRN        Social History:   Social History     Tobacco Use   • Smoking status: Never Smoker   • Smokeless tobacco: Never Used   Vaping Use   • Vaping Use: Never used   Substance Use Topics   • Alcohol use: Yes     Comment: social   • Drug use: Never     Recent travel: not applicable    Review of Systems:  Review of Systems   Constitutional: Negative for chills and fever.   HENT: Negative for congestion, rhinorrhea and sore throat.    Eyes: Negative for pain and visual disturbance.   Respiratory: Negative for apnea, cough, chest tightness and shortness of breath.    Cardiovascular: Negative for chest pain and palpitations.   Gastrointestinal: Positive for abdominal pain (right lower quadrant), diarrhea, nausea and vomiting.   Genitourinary: Negative for difficulty urinating and dysuria.   Musculoskeletal: Negative for joint swelling and myalgias.   Skin: Negative for color change.   Neurological: Negative for seizures and headaches.   Psychiatric/Behavioral: Negative.    All other systems reviewed and  "are negative.       Physical Exam:  BP 97/53   Pulse 85   Temp 97.9 °F (36.6 °C) (Oral)   Resp 20   Ht 160 cm (63\")   Wt 90.7 kg (200 lb)   LMP 07/27/2022   SpO2 95%   BMI 35.43 kg/m²     Physical Exam  Vitals and nursing note reviewed.   Constitutional:       General: She is not in acute distress.     Appearance: Normal appearance. She is not toxic-appearing.   HENT:      Head: Normocephalic and atraumatic.      Jaw: There is normal jaw occlusion.   Eyes:      General: Lids are normal.      Extraocular Movements: Extraocular movements intact.      Conjunctiva/sclera: Conjunctivae normal.      Pupils: Pupils are equal, round, and reactive to light.   Cardiovascular:      Rate and Rhythm: Normal rate and regular rhythm.      Pulses: Normal pulses.      Heart sounds: Normal heart sounds.   Pulmonary:      Effort: Pulmonary effort is normal. No respiratory distress.      Breath sounds: Normal breath sounds. No wheezing or rhonchi.   Abdominal:      General: Abdomen is flat.      Palpations: Abdomen is soft.      Tenderness: There is abdominal tenderness in the right lower quadrant and suprapubic area. There is no guarding or rebound.   Musculoskeletal:         General: Normal range of motion.      Cervical back: Normal range of motion and neck supple.      Right lower leg: No edema.      Left lower leg: No edema.   Skin:     General: Skin is warm and dry.   Neurological:      Mental Status: She is alert and oriented to person, place, and time. Mental status is at baseline.   Psychiatric:         Mood and Affect: Mood normal.                Medications in the Emergency Department:  Medications   sodium chloride 0.9 % flush 10 mL (has no administration in time range)   HYDROmorphone (DILAUDID) injection 1 mg (1 mg Intravenous Given 8/2/22 1410)   ondansetron (ZOFRAN) injection 4 mg (4 mg Intravenous Given 8/2/22 1410)   iopamidol (ISOVUE-370) 76 % injection 100 mL (100 mL Intravenous Given 8/2/22 2399) "   HYDROmorphone (DILAUDID) injection 1 mg (1 mg Intravenous Given 8/2/22 6798)        Labs  Lab Results (last 24 hours)     Procedure Component Value Units Date/Time    CBC & Differential [[id number]]  (Abnormal) Collected: 08/02/22 1305    Specimen: Blood Updated: 08/02/22 1317    Narrative:      The following orders were created for panel order CBC & Differential.  Procedure                               Abnormality         Status                     ---------                               -----------         ------                     CBC Auto Differential[[id number]]        Abnormal            Final result                 Please view results for these tests on the individual orders.    Comprehensive Metabolic Panel [[id number]]  (Abnormal) Collected: 08/02/22 1305    Specimen: Blood Updated: 08/02/22 1334     Glucose 96 mg/dL      BUN 12 mg/dL      Creatinine 0.72 mg/dL      Sodium 139 mmol/L      Potassium 3.8 mmol/L      Chloride 102 mmol/L      CO2 24.4 mmol/L      Calcium 10.9 mg/dL      Total Protein 8.7 g/dL      Albumin 5.50 g/dL      ALT (SGPT) 16 U/L      AST (SGOT) 15 U/L      Alkaline Phosphatase 76 U/L      Total Bilirubin 1.4 mg/dL      Globulin 3.2 gm/dL      A/G Ratio 1.7 g/dL      BUN/Creatinine Ratio 16.7     Anion Gap 12.6 mmol/L      eGFR 118.4 mL/min/1.73      Comment: National Kidney Foundation and American Society of Nephrology (ASN) Task Force recommended calculation based on the Chronic Kidney Disease Epidemiology Collaboration (CKD-EPI) equation refit without adjustment for race.       Narrative:      GFR Normal >60  Chronic Kidney Disease <60  Kidney Failure <15      Lipase [[id number]]  (Normal) Collected: 08/02/22 1305    Specimen: Blood Updated: 08/02/22 1334     Lipase 21 U/L     hCG, Quantitative, Pregnancy [[id number]] Collected: 08/02/22 1305    Specimen: Blood Updated: 08/02/22 1336     HCG Quantitative <0.50 mIU/mL     Narrative:      HCG Ranges by Gestational Age    Females -  non-pregnant premenopausal   </= 1mIU/mL HCG  Females - postmenopausal               </= 7mIU/mL HCG    3 Weeks       5.4   -      72 mIU/mL  4 Weeks      10.2   -     708 mIU/mL  5 Weeks       217   -   8,245 mIU/mL  6 Weeks       152   -  32,177 mIU/mL  7 Weeks     4,059   - 153,767 mIU/mL  8 Weeks    31,366   - 149,094 mIU/mL  9 Weeks    59,109   - 135,901 mIU/mL  10 Weeks   44,186   - 170,409 mIU/mL  12 Weeks   27,107   - 201,615 mIU/mL  14 Weeks   24,302   -  93,646 mIU/mL  15 Weeks   12,540   -  69,747 mIU/mL  16 Weeks    8,904   -  55,332 mIU/mL  17 Weeks    8,240   -  51,793 mIU/mL  18 Weeks    9,649   -  55,271 mIU/mL    Results may be falsely decreased if patient taking Biotin.      CBC Auto Differential [[id number]]  (Abnormal) Collected: 08/02/22 1305    Specimen: Blood Updated: 08/02/22 1317     WBC 8.89 10*3/mm3      RBC 4.96 10*6/mm3      Hemoglobin 14.9 g/dL      Hematocrit 43.6 %      MCV 87.9 fL      MCH 30.0 pg      MCHC 34.2 g/dL      RDW 13.6 %      RDW-SD 43.9 fl      MPV 11.7 fL      Platelets 229 10*3/mm3      Neutrophil % 77.9 %      Lymphocyte % 13.8 %      Monocyte % 7.1 %      Eosinophil % 0.4 %      Basophil % 0.6 %      Immature Grans % 0.2 %      Neutrophils, Absolute 6.92 10*3/mm3      Lymphocytes, Absolute 1.23 10*3/mm3      Monocytes, Absolute 0.63 10*3/mm3      Eosinophils, Absolute 0.04 10*3/mm3      Basophils, Absolute 0.05 10*3/mm3      Immature Grans, Absolute 0.02 10*3/mm3      nRBC 0.0 /100 WBC     Urinalysis With Microscopic If Indicated (No Culture) - Urine, Clean Catch [[id number]]  (Abnormal) Collected: 08/02/22 1507    Specimen: Urine, Clean Catch Updated: 08/02/22 1519     Color, UA Yellow     Appearance, UA Clear     pH, UA 7.5     Specific Gravity, UA >=1.030     Glucose, UA Negative     Ketones, UA Negative     Bilirubin, UA Negative     Blood, UA Negative     Protein, UA Negative     Leuk Esterase, UA Trace     Nitrite, UA Negative     Urobilinogen, UA 1.0 E.U./dL     Urinalysis, Microscopic Only - Urine, Clean Catch [[id number]]  (Abnormal) Collected: 08/02/22 1507    Specimen: Urine, Clean Catch Updated: 08/02/22 1519     RBC, UA 0-2 /HPF      WBC, UA 0-2 /HPF      Bacteria, UA None Seen /HPF      Squamous Epithelial Cells, UA 0-2 /HPF      Hyaline Casts, UA 0-2 /LPF      Methodology Automated Microscopy           Imaging:  US Non-ob Transvaginal    Result Date: 8/2/2022  PROCEDURE: US NON-OB TRANSVAGINAL  COMPARISON: [facility], CT, CT ABDOMEN PELVIS W CONTRAST, 8/02/2022, 14:56.  INDICATIONS: right adnexa pain, r/o ovarian torsion  TECHNIQUE: Ultrasound examination of the pelvis was performed, using endovaginal technique.   FINDINGS:  UTERUS: Size is 8.2 x 4.0 x 4.9 cm with unremarkable appearance.  Endometrial thickness is 6.6 mm.   ADNEXAE: The left ovary measures 4.2 x 1.8 x 2.5 cm and contains a 1.8 cm cyst.  The right ovary measures 3.2 x 1.8 x 1.5 cm and appears normal.  Normal flow is seen within both ovaries. CUL-DE-SAC: There is pelvic free fluid. OTHER: Negative.         1. Uterus and right adnexa appear within normal limits. 2. 1.8 cm left adnexal cyst and pelvic free fluid, likely physiologic.     MARY ALICE BROWN MD       Electronically Signed and Approved By: MARY ALICE BROWN MD on 8/02/2022 at 17:49             CT Abdomen Pelvis With Contrast    Result Date: 8/2/2022  PROCEDURE: CT ABDOMEN PELVIS W CONTRAST  COMPARISON: [facility], CT, CT ABDOMEN PELVIS WO CONTRAST, 7/13/2022, 3:09.  INDICATIONS: rlq abdominal pain  TECHNIQUE: After obtaining the patient's consent, CT images were created with non-ionic intravenous contrast material.   PROTOCOL:   Standard imaging protocol performed    RADIATION:   DLP: 699 mGy*cm   Automated exposure control was utilized to minimize radiation dose. CONTRAST: 100 cc Isovue 370 I.V.  FINDINGS:  Lung bases are clear.  There is a stent in IVC within the thorax incompletely visualized.  A similar finding  was seen on 7/13/2022.  Small inguinal lymph nodes are seen.  The pancreas within normal limits.  No adrenal findings are seen.  Kidneys enhance symmetrically.  No stones are evident.  No obstructive uropathy seen.  There may be a small left ovarian cyst measuring about 2.2 centimeters.  There is a small amount of fluid in the pelvis likely physiologic.  Distal colon not well-distended.  There is mild diverticulosis.  Possible thickening of proximal transverse colon and hepatic flexure.  Cecum is normal.  No appendicitis seen.  Stomach is not well-distended.  No small bowel findings are clearly evident.  The aorta is normal.  There is no adenopathy.  No other significant ascites.  Mild spine degenerative changes.        1. Stent within the intrathoracic IVC similar to previous CT. 2. Mild heterogeneous enhancement the liver.  There could be mild periportal edema.  Correlate with liver function tests.  Findings are nonspecific.  The portal vein appears patent.  The hepatic veins are not well opacified.  3. Small amount of free fluid the pelvis likely physiologic.  There may be a small left ovarian cyst. 4. Thickening of the proximal transverse colon extending to the hepatic flexure.  In a young patient this could reflect colitis.  Colon mass less likely in a young patient.  Consider follow-up to ensure resolution. 5. Other findings as above.     MAGDALENA AMAYA MD       Electronically Signed and Approved By: MAGDALENA AMAYA MD on 8/02/2022 at 16:00               Procedures:  Procedures    Progress                            Medical Decision Making:  MDM  Number of Diagnoses or Management Options  Right lower quadrant abdominal pain  Diagnosis management comments: In summary is a 27-year-old female who presents to the emerged part with complaints of right lower quadrant abdominal pain.  Laboratory evaluation including CBC, CMP, urinalysis, hCG are negative and unremarkable.  CT scan of the abdomen pelvis is  negative for acute appendicitis possible area of small colitis in the proximal transverse colon for which patient states she had diarrhea in the remote weeks.  Transvaginal ultrasound shows no right ovarian cyst and good Doppler flow, no ovarian torsion.  Unsure the exact cause of patient's abdominal pain at this time.  She will be discharged home follow-up with PCP and OB/GYN.  Very strict return to ER and follow-up instructions have been provided to the patient.         Amount and/or Complexity of Data Reviewed  Clinical lab tests: reviewed         Final diagnoses:   Right lower quadrant abdominal pain        Disposition:  ED Disposition     ED Disposition   Discharge    Condition   Stable    Comment   --             This medical record created using voice recognition software and a virtual scribe.    Documentation assistance provided by Zo Cruz acting as scribe for Dr. Presley Salmeron MD. Information recorded by the scribe was done at my direction and has been verified and validated by me.            Zo Cruz  08/02/22 8075       Zo Cruz  08/02/22 4110       Presley Salmeron MD  08/02/22 6074

## 2022-08-02 NOTE — PROGRESS NOTES
"Chief Complaint  Follow-up (She states she has not been having bowel movements. She states nothing helps. She's nauseous. She cannot eat, when she does she throws up. Also she has a pain in her groin on her right side. It hurts to walk, sit, move. And suffering from a cough. )  Subjective        History of Present Illness  Stephanie Slaughter is a 27 y.o. female who presents to [facility] for complaint of abdominal pain that started yesterday and worsening.  The pain woke her up while sleeping.  The pain is located in the mid and right lower quadrant and is constant.  She has has nausea and vomiting since yesterday.    Review of Systems  Constitutional: Negative for loss of taste or smell, fever and chills.  Positive for loss of appetite.  HEENT: Negative for sinus pain, congestion and earache. Positive for sore throat and headache.   Respiratory: Negative for wheezing or dyspnea. Positive for cough.  Gastrointestinal:  Positive for nausea and vomiting.  Constipation and bright red blood with wiping for the past 2 weeks.  Urinary: Positive for dysuria. Negative for hematuria,  urgency or frequency.  Musculoskeletal pain: Low back pain x 2 days.       Past Medical History:   Diagnosis Date   • ADHD (attention deficit hyperactivity disorder)    • Anxiety    • Depression    • Heart abnormality         Past Surgical History:   Procedure Laterality Date   • CARDIAC SURGERY     •  SECTION     • EYE SURGERY     • WISDOM TOOTH EXTRACTION          Allergies   Allergen Reactions   • Oseltamivir Phosphate Other (See Comments)     \"tamiflu\"  hallucinations          Current Outpatient Medications:   •  amphetamine-dextroamphetamine (ADDERALL) 10 MG tablet, Take 10 mg by mouth Daily., Disp: , Rfl:   •  aspirin 81 MG chewable tablet, Chew 81 mg Daily., Disp: , Rfl:   •  buPROPion XL (WELLBUTRIN XL) 150 MG 24 hr tablet, Take 1 tablet by mouth Daily. Patient does not always take this medication " "because of how it makes her feel., Disp: , Rfl:   •  buPROPion XL (WELLBUTRIN XL) 300 MG 24 hr tablet, Take 1 tablet by mouth Daily. Patient does not always take this medication because of how it makes her feel., Disp: , Rfl:   •  dicyclomine (BENTYL) 20 MG tablet, Take 1 tablet by mouth Every 8 (Eight) Hours As Needed (abdominal pain)., Disp: 30 tablet, Rfl: 0  •  Hydrocortisone, Perianal, (Procto-Med HC) 2.5 % rectal cream, Insert  into the rectum 2 (Two) Times a Day., Disp: 30 g, Rfl: 1  •  loperamide (IMODIUM) 2 MG capsule, Take 1 capsule by mouth 4 (Four) Times a Day As Needed for Diarrhea., Disp: 20 capsule, Rfl: 0  •  ondansetron ODT (ZOFRAN-ODT) 4 MG disintegrating tablet, Place 1 tablet on the tongue Every 8 (Eight) Hours As Needed for Nausea or Vomiting., Disp: 15 tablet, Rfl: 0  •  saccharomyces boulardii (Florastor) 250 MG capsule, Take 1 capsule by mouth 2 (Two) Times a Day., Disp: 60 capsule, Rfl: 0    Objective   /70 (BP Location: Right arm, Patient Position: Sitting, Cuff Size: Large Adult)   Pulse 86   Temp 98.7 °F (37.1 °C) (Temporal)   Ht 160 cm (63\")   Wt 92.9 kg (204 lb 12.8 oz)   SpO2 95%   BMI 36.28 kg/m²    Estimated body mass index is 36.28 kg/m² as calculated from the following:    Height as of this encounter: 160 cm (63\").    Weight as of this encounter: 92.9 kg (204 lb 12.8 oz).   Physical Exam  Vitals reviewed.   Constitutional:       Appearance: She is ill-appearing.   HENT:      Head: Normocephalic and atraumatic.      Mouth/Throat:      Mouth: Mucous membranes are moist.      Pharynx: Oropharynx is clear.   Cardiovascular:      Rate and Rhythm: Normal rate and regular rhythm.      Heart sounds: Normal heart sounds.   Pulmonary:      Effort: Pulmonary effort is normal.      Breath sounds: Normal breath sounds. No wheezing, rhonchi or rales.   Abdominal:      Palpations: Abdomen is soft. There is no mass.      Tenderness: There is generalized abdominal tenderness and " tenderness in the right lower quadrant. There is right CVA tenderness and rebound. There is no left CVA tenderness. Positive signs include Rovsing's sign and McBurney's sign.   Lymphadenopathy:      Cervical: No cervical adenopathy.   Skin:     General: Skin is warm and dry.   Neurological:      General: No focal deficit present.      Mental Status: She is alert.   Psychiatric:         Thought Content: Thought content normal.             Assessment and Plan    Diagnoses and all orders for this visit:    1. Acute right lower quadrant pain (Primary)  -     Cancel: CT Abdomen Pelvis Without Contrast; Future    Differential diagnosis discussed.  Patient's vital signs are stable, however she is in moderate to severe abdominal pain.  Due to the severity of her symptoms the patient sent to the emergency department from the office.  I called report to VIKY Heck in the ED.    Follow Up     Patient was given instructions and counseling regarding her condition. Please see specific information pulled into the AVS if appropriate.   No follow-ups on file.    LELE Moreira

## 2022-08-03 ENCOUNTER — TELEPHONE (OUTPATIENT)
Dept: INTERNAL MEDICINE | Facility: CLINIC | Age: 27
End: 2022-08-03

## 2022-08-03 DIAGNOSIS — R10.31 ACUTE RIGHT LOWER QUADRANT PAIN: Primary | ICD-10-CM

## 2022-08-03 NOTE — TELEPHONE ENCOUNTER
Caller: Stephanie Slaughter    Relationship: Self    Best call back number: [phone]    What is the medical concern/diagnosis: LOWER ABDOMINAL PAIN, CONSTIPATION     What specialty or service is being requested: GASTROENTEROLOGIST SPECIALIST     What is the provider, practice or medical service name: UNKNOWN, DOCTORS SUGGESTION       Any additional details: PATIENT WAS SEEN ON 08.02.2022 BY LELE MARS WHO REFERRED HER TO EMERGENCY MEDICINE TO LOOK AT HER APPENDIX. THE ER STATED IT WAS NOT HER APPENDIX AND WOULD LIKE HER TO GET A REFERRAL TO A GI SPECIALIST

## 2022-09-15 ENCOUNTER — OFFICE VISIT (OUTPATIENT)
Dept: OBSTETRICS AND GYNECOLOGY | Facility: CLINIC | Age: 27
End: 2022-09-15

## 2022-09-15 VITALS
HEART RATE: 91 BPM | DIASTOLIC BLOOD PRESSURE: 76 MMHG | SYSTOLIC BLOOD PRESSURE: 114 MMHG | BODY MASS INDEX: 35.79 KG/M2 | WEIGHT: 202 LBS | HEIGHT: 63 IN

## 2022-09-15 DIAGNOSIS — N93.9 ABNORMAL UTERINE BLEEDING: Primary | ICD-10-CM

## 2022-09-15 DIAGNOSIS — R10.2 PELVIC PAIN: ICD-10-CM

## 2022-09-15 LAB
B-HCG UR QL: NEGATIVE
C TRACH RRNA CVX QL NAA+PROBE: DETECTED
CANDIDA SPECIES: NEGATIVE
EXPIRATION DATE: NORMAL
GARDNERELLA VAGINALIS: POSITIVE
INTERNAL NEGATIVE CONTROL: NEGATIVE
INTERNAL POSITIVE CONTROL: POSITIVE
Lab: NORMAL
N GONORRHOEA RRNA SPEC QL NAA+PROBE: NOT DETECTED
T VAGINALIS DNA VAG QL PROBE+SIG AMP: NEGATIVE

## 2022-09-15 PROCEDURE — 87660 TRICHOMONAS VAGIN DIR PROBE: CPT | Performed by: OBSTETRICS & GYNECOLOGY

## 2022-09-15 PROCEDURE — 87480 CANDIDA DNA DIR PROBE: CPT | Performed by: OBSTETRICS & GYNECOLOGY

## 2022-09-15 PROCEDURE — 99213 OFFICE O/P EST LOW 20 MIN: CPT | Performed by: OBSTETRICS & GYNECOLOGY

## 2022-09-15 PROCEDURE — 81025 URINE PREGNANCY TEST: CPT | Performed by: OBSTETRICS & GYNECOLOGY

## 2022-09-15 PROCEDURE — 87591 N.GONORRHOEAE DNA AMP PROB: CPT | Performed by: OBSTETRICS & GYNECOLOGY

## 2022-09-15 PROCEDURE — 87491 CHLMYD TRACH DNA AMP PROBE: CPT | Performed by: OBSTETRICS & GYNECOLOGY

## 2022-09-15 PROCEDURE — 87510 GARDNER VAG DNA DIR PROBE: CPT | Performed by: OBSTETRICS & GYNECOLOGY

## 2022-09-15 NOTE — PROGRESS NOTES
"GYN Problem/Follow Up Visit    Chief Complaint   Patient presents with   • PELVIC PAIN AND HEAVY BLEEDING           HPI  Stephanie Slaughter is a 27 y.o. female, , who presents for aub and pelvic pain. States had c section and tubal ligation in November and has had right sided pelvic pain ever since. States it occurs with her menses. States a week ago it worsened and became a constant crampy pain. Typically q mon menses but three days ago she started bleeding only a couple of weeks after her menses stopped. States it is heavy and passing clots. Had noticed a vaginal odor recently. Requests std screen.         Additional OB/GYN History   Patient's last menstrual period was 2022 (approximate).  Current contraception: contraceptive methods: Tubal ligation  Desires to: continue contraception  Allergies : Oseltamivir phosphate     The additional following portions of the patient's history were reviewed and updated as appropriate: allergies, current medications, past family history, past medical history, past social history, past surgical history and problem list.    Review of Systems    I have reviewed and agree with the HPI, ROS, and historical information as entered above. Larissa Garcia, APRN    Objective   /76   Pulse 91   Ht 160 cm (63\")   Wt 91.6 kg (202 lb)   LMP 2022 (Approximate)   BMI 35.78 kg/m²     Physical Exam  Vitals reviewed.   Genitourinary:     General: Normal vulva.      Vagina: No foreign body. Bleeding (moderate amt of vb noted) present. No tenderness, lesions or prolapsed vaginal walls.      Cervix: No cervical motion tenderness or lesion.      Uterus: Normal.       Adnexa: Left adnexa normal.        Right: Tenderness present.    Skin:     General: Skin is warm and dry.   Neurological:      Mental Status: She is alert and oriented to person, place, and time.            Assessment and Plan    Diagnoses and all orders for this visit:    1. Abnormal uterine bleeding (Primary)  -   "   US Non-ob Transvaginal; Future  -     Chlamydia trachomatis, Neisseria gonorrhoeae, PCR - Swab, Cervix  -     Gardnerella vaginalis, Trichomonas vaginalis, Candida albicans, DNA - Swab, Vagina    2. Pelvic pain  -     POC Pregnancy, Urine  -     US Non-ob Transvaginal; Future  -     Chlamydia trachomatis, Neisseria gonorrhoeae, PCR - Swab, Cervix  -     Gardnerella vaginalis, Trichomonas vaginalis, Candida albicans, DNA - Swab, Vagina    will check for infections. Will check pelvic u/s. Discussed trial of provera if aub continues. F/u after u/s.     Counseling:  She understands the importance of having the above orders performed in a timely fashion.  She is encouraged to review her results online and/or contact or office if she has questions.     Follow Up:  Return for u/s f/u.      Larissa Garcia, LELE  09/15/2022

## 2022-09-19 RX ORDER — DOXYCYCLINE HYCLATE 100 MG/1
100 CAPSULE ORAL 2 TIMES DAILY
Qty: 14 CAPSULE | Refills: 0 | Status: SHIPPED | OUTPATIENT
Start: 2022-09-19 | End: 2022-09-26

## 2022-09-19 RX ORDER — METRONIDAZOLE 500 MG/1
500 TABLET ORAL 2 TIMES DAILY
Qty: 14 TABLET | Refills: 0 | Status: SHIPPED | OUTPATIENT
Start: 2022-09-19 | End: 2022-09-26

## 2022-09-19 NOTE — PROGRESS NOTES
Discussed results with pt. Doxycycline sent. Needs partner treatment. Abstain from intercourse or use a condom until negative january. Patient scheduled for a january in 4-6 weeks. Patient is also aware that provider sent Flagyl for bv. Reportable form faxed.

## 2022-10-06 ENCOUNTER — APPOINTMENT (OUTPATIENT)
Dept: ULTRASOUND IMAGING | Facility: HOSPITAL | Age: 27
End: 2022-10-06

## 2022-10-31 ENCOUNTER — OFFICE VISIT (OUTPATIENT)
Dept: OBSTETRICS AND GYNECOLOGY | Facility: CLINIC | Age: 27
End: 2022-10-31

## 2022-10-31 VITALS
WEIGHT: 202 LBS | HEIGHT: 63 IN | BODY MASS INDEX: 35.79 KG/M2 | SYSTOLIC BLOOD PRESSURE: 117 MMHG | HEART RATE: 111 BPM | DIASTOLIC BLOOD PRESSURE: 82 MMHG

## 2022-10-31 DIAGNOSIS — N89.8 VAGINAL DISCHARGE: Primary | ICD-10-CM

## 2022-10-31 LAB
C TRACH RRNA CVX QL NAA+PROBE: NOT DETECTED
CANDIDA SPECIES: NEGATIVE
GARDNERELLA VAGINALIS: NEGATIVE
N GONORRHOEA RRNA SPEC QL NAA+PROBE: NOT DETECTED
T VAGINALIS DNA VAG QL PROBE+SIG AMP: NEGATIVE

## 2022-10-31 PROCEDURE — 87491 CHLMYD TRACH DNA AMP PROBE: CPT | Performed by: OBSTETRICS & GYNECOLOGY

## 2022-10-31 PROCEDURE — 99212 OFFICE O/P EST SF 10 MIN: CPT | Performed by: OBSTETRICS & GYNECOLOGY

## 2022-10-31 PROCEDURE — 87660 TRICHOMONAS VAGIN DIR PROBE: CPT | Performed by: OBSTETRICS & GYNECOLOGY

## 2022-10-31 PROCEDURE — 87510 GARDNER VAG DNA DIR PROBE: CPT | Performed by: OBSTETRICS & GYNECOLOGY

## 2022-10-31 PROCEDURE — 87480 CANDIDA DNA DIR PROBE: CPT | Performed by: OBSTETRICS & GYNECOLOGY

## 2022-10-31 PROCEDURE — 87591 N.GONORRHOEAE DNA AMP PROB: CPT | Performed by: OBSTETRICS & GYNECOLOGY

## 2022-10-31 NOTE — PROGRESS NOTES
"GYN Problem/Follow Up Visit    Chief Complaint   Patient presents with   • TEST OF CURE           HPI  Stephanie Slaughter is a 27 y.o. female, , who presents for january. Recently dx with chlamydia and bv. States took meds and partner was treated. They have had unprotected intercourse since then. Worried she might still have bv and requests swab for both.       Additional OB/GYN History   Patient's last menstrual period was 10/17/2022.  Current contraception: contraceptive methods: Tubal ligation  Desires to: continue contraception  Allergies : Oseltamivir phosphate     The additional following portions of the patient's history were reviewed and updated as appropriate: allergies, current medications, past family history, past medical history, past social history, past surgical history and problem list.    Review of Systems    I have reviewed and agree with the HPI, ROS, and historical information as entered above. Larissa Garcia, APRN    Objective   /82   Pulse 111   Ht 160 cm (63\")   Wt 91.6 kg (202 lb)   LMP 10/17/2022   BMI 35.78 kg/m²     Physical Exam  Vitals reviewed.   Genitourinary:     General: Normal vulva.      Vagina: No signs of injury and foreign body. Vaginal discharge (thin white), erythema and tenderness present. No bleeding, lesions or prolapsed vaginal walls.      Cervix: Discharge and erythema present. No cervical motion tenderness, friability, lesion or cervical bleeding.   Skin:     General: Skin is warm and dry.   Neurological:      Mental Status: She is alert and oriented to person, place, and time.            Assessment and Plan    Diagnoses and all orders for this visit:    1. Vaginal discharge (Primary)  -     Chlamydia trachomatis, Neisseria gonorrhoeae, PCR - Swab, Cervix  -     Gardnerella vaginalis, Trichomonas vaginalis, Candida albicans, DNA - Swab, Vagina    will check for infections. Pelvic u/s ordered at last ov and she will f/u after that.     Counseling:  She understands " the importance of having the above orders performed in a timely fashion.  She is encouraged to review her results online and/or contact or office if she has questions.     Follow Up:  Return if symptoms worsen or fail to improve.      Larissa Garcia, APRN  10/31/2022

## 2022-11-04 ENCOUNTER — HOSPITAL ENCOUNTER (OUTPATIENT)
Dept: ULTRASOUND IMAGING | Facility: HOSPITAL | Age: 27
Discharge: HOME | End: 2022-11-04
Admitting: OBSTETRICS & GYNECOLOGY

## 2022-11-04 DIAGNOSIS — R10.2 PELVIC PAIN: ICD-10-CM

## 2022-11-04 DIAGNOSIS — N93.9 ABNORMAL UTERINE BLEEDING: ICD-10-CM

## 2022-11-04 PROCEDURE — 76830 TRANSVAGINAL US NON-OB: CPT

## 2022-12-19 ENCOUNTER — OFFICE VISIT (OUTPATIENT)
Dept: OBSTETRICS AND GYNECOLOGY | Facility: CLINIC | Age: 27
End: 2022-12-19

## 2022-12-19 VITALS
WEIGHT: 193 LBS | HEIGHT: 63 IN | BODY MASS INDEX: 34.2 KG/M2 | HEART RATE: 109 BPM | SYSTOLIC BLOOD PRESSURE: 127 MMHG | DIASTOLIC BLOOD PRESSURE: 86 MMHG

## 2022-12-19 DIAGNOSIS — Z11.3 ROUTINE SCREENING FOR STI (SEXUALLY TRANSMITTED INFECTION): ICD-10-CM

## 2022-12-19 DIAGNOSIS — R39.89 GENITAL SORE: Primary | ICD-10-CM

## 2022-12-19 PROCEDURE — 99213 OFFICE O/P EST LOW 20 MIN: CPT | Performed by: NURSE PRACTITIONER

## 2022-12-19 PROCEDURE — 87510 GARDNER VAG DNA DIR PROBE: CPT | Performed by: NURSE PRACTITIONER

## 2022-12-19 PROCEDURE — 87591 N.GONORRHOEAE DNA AMP PROB: CPT | Performed by: NURSE PRACTITIONER

## 2022-12-19 PROCEDURE — 87480 CANDIDA DNA DIR PROBE: CPT | Performed by: NURSE PRACTITIONER

## 2022-12-19 PROCEDURE — 87660 TRICHOMONAS VAGIN DIR PROBE: CPT | Performed by: NURSE PRACTITIONER

## 2022-12-19 PROCEDURE — 87529 HSV DNA AMP PROBE: CPT | Performed by: NURSE PRACTITIONER

## 2022-12-19 PROCEDURE — 87491 CHLMYD TRACH DNA AMP PROBE: CPT | Performed by: NURSE PRACTITIONER

## 2022-12-19 RX ORDER — PROPRANOLOL HYDROCHLORIDE 10 MG/1
10 TABLET ORAL 3 TIMES DAILY PRN
COMMUNITY
Start: 2022-12-08

## 2022-12-19 RX ORDER — VALACYCLOVIR HYDROCHLORIDE 1 G/1
1000 TABLET, FILM COATED ORAL 2 TIMES DAILY
Qty: 20 TABLET | Refills: 0 | Status: SHIPPED | OUTPATIENT
Start: 2022-12-19

## 2022-12-19 RX ORDER — BUPROPION HYDROCHLORIDE 150 MG/1
TABLET ORAL
COMMUNITY
Start: 2022-12-08 | End: 2023-03-24

## 2022-12-19 NOTE — PROGRESS NOTES
"GYN Visit    CC:   Chief Complaint   Patient presents with   • Gynecologic Exam     Screening        HPI:   27 y.o.     Her partner found out he has HSV and she think she does as well.  Having what she thinks is an initial outbreak, her partner did give her two days of medication which helped.  Wants STI screening as well.        History: PMHx, Meds, Allergies, PSHx, Social Hx, and POBHx all reviewed and updated.    Review of Systems   Constitutional: Negative.    Genitourinary: Positive for genital sores.       PHYSICAL EXAM:  /86   Pulse 109   Ht 160 cm (62.99\")   Wt 87.5 kg (193 lb)   BMI 34.20 kg/m²      Physical Exam  Vitals and nursing note reviewed. Exam conducted with a chaperone present.   Constitutional:       Appearance: Normal appearance. She is well-developed and well-groomed.   HENT:      Head: Normocephalic.   Eyes:      Pupils: Pupils are equal, round, and reactive to light.   Genitourinary:     General: Normal vulva.      Exam position: Lithotomy position.      Pubic Area: No rash or pubic lice.       Labia:         Right: No rash, tenderness, lesion or injury.         Left: No rash, tenderness, lesion or injury.       Vagina: No foreign body. Tenderness present. No vaginal discharge, erythema, bleeding or lesions.      Cervix: No cervical motion tenderness or discharge.       Skin:     General: Skin is warm and dry.   Neurological:      General: No focal deficit present.      Mental Status: She is alert.         ASSESSMENT AND PLAN:  Diagnoses and all orders for this visit:    1. Genital sore (Primary)  -     HSV 1/2, PCR (Non-CSF) - Swab, Vulva  -     valACYclovir (Valtrex) 1000 MG tablet; Take 1 tablet by mouth 2 (Two) Times a Day.  Dispense: 20 tablet; Refill: 0    2. Routine screening for STI (sexually transmitted infection)  -     Chlamydia trachomatis, Neisseria gonorrhoeae, PCR - Swab, Cervix  -     Gardnerella vaginalis, Trichomonas vaginalis, Candida albicans, DNA - Swab, " Cervix        Counseling:  • Will treat for HSV while awaiting swab results.  Discussed both recurrent and supressive treatment.     Follow Up:  Return as needed.          Gurpreet Hankins, APRN  12/19/2022    [facility] [address]  [facility]  [address] [place] [address]  Dept: [phone]  Loc: [phone]

## 2022-12-20 ENCOUNTER — TELEPHONE (OUTPATIENT)
Dept: OBSTETRICS AND GYNECOLOGY | Facility: CLINIC | Age: 27
End: 2022-12-20

## 2022-12-20 LAB
HSV1 DNA SPEC QL NAA+PROBE: NOT DETECTED
HSV2 DNA SPEC QL NAA+PROBE: NOT DETECTED

## 2022-12-20 NOTE — TELEPHONE ENCOUNTER
----- Message from LELE Bhatt sent at 12/20/2022 12:04 PM EST -----  Please let patient know her swabs are all negative.  If develops another lesion would recommend she come in for repeat swab.

## 2023-03-24 ENCOUNTER — OFFICE VISIT (OUTPATIENT)
Dept: OBSTETRICS AND GYNECOLOGY | Facility: CLINIC | Age: 28
End: 2023-03-24
Payer: MEDICAID

## 2023-03-24 VITALS
SYSTOLIC BLOOD PRESSURE: 136 MMHG | DIASTOLIC BLOOD PRESSURE: 84 MMHG | BODY MASS INDEX: 34.55 KG/M2 | HEIGHT: 63 IN | HEART RATE: 87 BPM | WEIGHT: 195 LBS

## 2023-03-24 DIAGNOSIS — Z11.3 ROUTINE SCREENING FOR STI (SEXUALLY TRANSMITTED INFECTION): Primary | ICD-10-CM

## 2023-03-24 LAB
C TRACH RRNA CVX QL NAA+PROBE: NOT DETECTED
CANDIDA SPECIES: NEGATIVE
GARDNERELLA VAGINALIS: POSITIVE
N GONORRHOEA RRNA SPEC QL NAA+PROBE: DETECTED
T VAGINALIS DNA VAG QL PROBE+SIG AMP: NEGATIVE

## 2023-03-24 PROCEDURE — 87591 N.GONORRHOEAE DNA AMP PROB: CPT | Performed by: NURSE PRACTITIONER

## 2023-03-24 PROCEDURE — 87491 CHLMYD TRACH DNA AMP PROBE: CPT | Performed by: NURSE PRACTITIONER

## 2023-03-24 PROCEDURE — 87480 CANDIDA DNA DIR PROBE: CPT | Performed by: NURSE PRACTITIONER

## 2023-03-24 PROCEDURE — 87660 TRICHOMONAS VAGIN DIR PROBE: CPT | Performed by: NURSE PRACTITIONER

## 2023-03-24 PROCEDURE — 87510 GARDNER VAG DNA DIR PROBE: CPT | Performed by: NURSE PRACTITIONER

## 2023-03-24 RX ORDER — ESCITALOPRAM OXALATE 10 MG/1
TABLET ORAL
COMMUNITY
Start: 2023-03-03

## 2023-03-24 NOTE — PROGRESS NOTES
"GYN Visit    CC:   Chief Complaint   Patient presents with   • Gynecologic Exam     Std screen        HPI:   27 y.o. Contraception or HRT: Contraception:  Tubal ligation    Wants to be screened for STIs, had unprotected intercourse with a new partner.  Does not want to have blood drawn today.    History: PMHx, Meds, Allergies, PSHx, Social Hx, and POBHx all reviewed and updated.    Review of Systems   Constitutional: Negative.    Genitourinary: Negative.        PHYSICAL EXAM:  /84   Pulse 87   Ht 160 cm (62.99\")   Wt 88.5 kg (195 lb)   BMI 34.55 kg/m²      Physical Exam  Vitals and nursing note reviewed. Exam conducted with a chaperone present.   Constitutional:       Appearance: Normal appearance. She is well-developed and well-groomed.   HENT:      Head: Normocephalic.   Eyes:      Pupils: Pupils are equal, round, and reactive to light.   Genitourinary:     General: Normal vulva.      Exam position: Lithotomy position.      Pubic Area: No rash or pubic lice.       Labia:         Right: No rash, tenderness, lesion or injury.         Left: No rash, tenderness, lesion or injury.       Vagina: Normal. No foreign body. No vaginal discharge, erythema, tenderness, bleeding or lesions.      Cervix: No cervical motion tenderness or discharge.   Skin:     General: Skin is warm and dry.   Neurological:      General: No focal deficit present.      Mental Status: She is alert.         ASSESSMENT AND PLAN:  Diagnoses and all orders for this visit:    1. Routine screening for STI (sexually transmitted infection) (Primary)  -     Chlamydia trachomatis, Neisseria gonorrhoeae, PCR - Swab, Cervix  -     Gardnerella vaginalis, Trichomonas vaginalis, Candida albicans, DNA - Swab, Cervix        Counseling:  • will call with any needed treatment    Follow Up:  Return as needed.        Gurpreet Hankins, APRN  2023    [facility] [address]  [facility]  [address] " [id number]  Dept: [phone]  Loc: [phone]

## 2023-03-27 ENCOUNTER — TELEPHONE (OUTPATIENT)
Dept: OBSTETRICS AND GYNECOLOGY | Facility: CLINIC | Age: 28
End: 2023-03-27
Payer: MEDICAID

## 2023-03-27 DIAGNOSIS — A54.9 GONORRHEA: Primary | ICD-10-CM

## 2023-03-27 DIAGNOSIS — N76.0 ACUTE VAGINITIS: ICD-10-CM

## 2023-03-27 RX ORDER — METRONIDAZOLE 500 MG/1
500 TABLET ORAL 2 TIMES DAILY
Qty: 14 TABLET | Refills: 0 | Status: SHIPPED | OUTPATIENT
Start: 2023-03-27 | End: 2023-04-03

## 2023-03-27 NOTE — TELEPHONE ENCOUNTER
Patient called this am.  She talked with someone @ [facility].  I have attached her note.  Last seen 3/24/23.  Next appointment 3/27/23 infusion

## 2023-03-27 NOTE — TELEPHONE ENCOUNTER
Caller: Stephanie Slaughter    Relationship to patient: Self    Best call back number: [phone]    Patient is needing: PT WAS ADVISED TO START TAKING INFUSIONS, WOULD PREFER TO HAVE THE MEDICATION IN PRESCRIPTION FORM IF POSSIBLE.    PLEASE CALL PT TO DISCUSS AND ADVISE.

## 2023-03-31 ENCOUNTER — HOSPITAL ENCOUNTER (OUTPATIENT)
Dept: INFUSION THERAPY | Facility: HOSPITAL | Age: 28
Discharge: HOME | End: 2023-03-31
Admitting: NURSE PRACTITIONER
Payer: MEDICAID

## 2023-03-31 VITALS
WEIGHT: 194 LBS | BODY MASS INDEX: 34.38 KG/M2 | SYSTOLIC BLOOD PRESSURE: 118 MMHG | OXYGEN SATURATION: 96 % | RESPIRATION RATE: 20 BRPM | HEART RATE: 58 BPM | DIASTOLIC BLOOD PRESSURE: 80 MMHG | HEIGHT: 63 IN | TEMPERATURE: 98 F

## 2023-03-31 DIAGNOSIS — A54.9 GONORRHEA: Primary | ICD-10-CM

## 2023-03-31 PROCEDURE — 25010000002 CEFTRIAXONE PER 250 MG: Performed by: NURSE PRACTITIONER

## 2023-03-31 PROCEDURE — 96372 THER/PROPH/DIAG INJ SC/IM: CPT

## 2023-03-31 RX ORDER — LIDOCAINE HYDROCHLORIDE 10 MG/ML
1.8 INJECTION, SOLUTION INFILTRATION; PERINEURAL ONCE
Status: CANCELLED
Start: 2023-03-31 | End: 2023-03-31

## 2023-03-31 RX ORDER — LIDOCAINE HYDROCHLORIDE 10 MG/ML
1.8 INJECTION, SOLUTION EPIDURAL; INFILTRATION; INTRACAUDAL; PERINEURAL ONCE
Status: COMPLETED | OUTPATIENT
Start: 2023-03-31 | End: 2023-03-31

## 2023-03-31 RX ADMIN — LIDOCAINE HYDROCHLORIDE 1.8 ML: 10 INJECTION, SOLUTION EPIDURAL; INFILTRATION; INTRACAUDAL; PERINEURAL at 18:16

## 2023-03-31 RX ADMIN — LIDOCAINE HYDROCHLORIDE 500 MG: 10 INJECTION, SOLUTION EPIDURAL; INFILTRATION; INTRACAUDAL; PERINEURAL at 18:15

## 2023-05-03 ENCOUNTER — OFFICE VISIT (OUTPATIENT)
Dept: INTERNAL MEDICINE | Facility: CLINIC | Age: 28
End: 2023-05-03
Payer: MEDICAID

## 2023-05-03 VITALS
SYSTOLIC BLOOD PRESSURE: 102 MMHG | DIASTOLIC BLOOD PRESSURE: 70 MMHG | HEIGHT: 63 IN | OXYGEN SATURATION: 95 % | WEIGHT: 191 LBS | TEMPERATURE: 97.3 F | BODY MASS INDEX: 33.84 KG/M2 | HEART RATE: 115 BPM

## 2023-05-03 DIAGNOSIS — R10.9 ABDOMINAL PAIN, UNSPECIFIED ABDOMINAL LOCATION: Primary | ICD-10-CM

## 2023-05-03 DIAGNOSIS — N39.0 ACUTE URINARY TRACT INFECTION: ICD-10-CM

## 2023-05-03 LAB
BILIRUB BLD-MCNC: ABNORMAL MG/DL
CLARITY, POC: ABNORMAL
COLOR UR: ABNORMAL
EXPIRATION DATE: ABNORMAL
GLUCOSE UR STRIP-MCNC: NEGATIVE MG/DL
KETONES UR QL: ABNORMAL
LEUKOCYTE EST, POC: ABNORMAL
Lab: ABNORMAL
NITRITE UR-MCNC: POSITIVE MG/ML
PH UR: 5.5 [PH] (ref 5–8)
PROT UR STRIP-MCNC: ABNORMAL MG/DL
RBC # UR STRIP: ABNORMAL /UL
SP GR UR: 1.03 (ref 1–1.03)
UROBILINOGEN UR QL: ABNORMAL

## 2023-05-03 RX ORDER — HYDROCODONE BITARTRATE AND ACETAMINOPHEN 5; 325 MG/1; MG/1
1 TABLET ORAL EVERY 6 HOURS PRN
Qty: 10 TABLET | Refills: 0 | Status: SHIPPED | OUTPATIENT
Start: 2023-05-03

## 2023-05-03 RX ORDER — LEVOFLOXACIN 750 MG/1
750 TABLET ORAL DAILY
Qty: 5 TABLET | Refills: 0 | Status: SHIPPED | OUTPATIENT
Start: 2023-05-03 | End: 2023-05-08

## 2023-05-03 NOTE — PROGRESS NOTES
"Chief Complaint  Abdominal Pain (Started this morning, this is the most sever pain shes ever had. She states everytime she is on her period she gets cramps but nothing this severe. Has multiple ovarian cysts. Shes had these for several months. Patient was treated for chlamydia.)    Subjective    History of Present Illness:  Stephanie Slaughter presents to [facility] for complaint of abdominal pain that started this morning.  The patient started her menses overnight.  The patient reports that every time she gets her menstrual cycle she gets cramps for the past several months but reports it has not been this extreme.  The patient reports that she has been treated for chlamydia and multiple ovarian cyst.  Her gynecologist is LELE Bhatt.  The patient is currently taking Augmentin for an upper respiratory infection.    Review of Systems  Constitutional: Negative for fever and chills.    Gastrointestinal: Negative for nausea, vomiting and diarrhea.    Genitourinary: Negative for urinary frequency and dysuria.  Musculoskeletal: Positive for low back pain.      Past Medical History:   Diagnosis Date   • ADHD (attention deficit hyperactivity disorder)    • Anxiety    • Depression    • Heart abnormality         Past Surgical History:   Procedure Laterality Date   • CARDIAC SURGERY     •  SECTION     • EYE SURGERY     • TUBAL ABDOMINAL LIGATION     • WISDOM TOOTH EXTRACTION          Allergies   Allergen Reactions   • Oseltamivir Phosphate Other (See Comments)     \"tamiflu\"  hallucinations          Current Outpatient Medications:   •  Adderall XR 30 MG 24 hr capsule, Take 1 capsule by mouth Daily, Disp: , Rfl:   •  amoxicillin-clavulanate (AUGMENTIN) 875-125 MG per tablet, Take 1 tablet by mouth 2 (Two) Times a Day for 10 days., Disp: 20 tablet, Rfl: 0  •  amphetamine-dextroamphetamine (ADDERALL) 20 MG tablet, TAKE 1 TABLET BY MOUTH EVERY AFTERNOON, Disp: , Rfl:   •  aspirin 81 " "MG chewable tablet, Chew 1 tablet Daily., Disp: , Rfl:   •  escitalopram (LEXAPRO) 10 MG tablet, , Disp: , Rfl:   •  fluconazole (DIFLUCAN) 150 MG tablet, Take 1 tablet for symptoms, can take second tablet 72 hours later if symptoms persist., Disp: 2 tablet, Rfl: 0  •  propranolol (INDERAL) 10 MG tablet, Take 1 tablet by mouth 3 (Three) Times a Day As Needed., Disp: , Rfl:   •  traZODone (DESYREL) 100 MG tablet, Take 1 tablet by mouth At Night As Needed., Disp: , Rfl:   •  valACYclovir (Valtrex) 1000 MG tablet, Take 1 tablet by mouth 2 (Two) Times a Day., Disp: 20 tablet, Rfl: 0  •  HYDROcodone-acetaminophen (Norco) 5-325 MG per tablet, Take 1 tablet by mouth Every 6 (Six) Hours As Needed for Moderate Pain or Severe Pain., Disp: 10 tablet, Rfl: 0  •  levoFLOXacin (Levaquin) 750 MG tablet, Take 1 tablet by mouth Daily for 5 days., Disp: 5 tablet, Rfl: 0    Objective   Vital Signs:   /70 (BP Location: Right arm, Patient Position: Sitting, Cuff Size: Adult)   Pulse 115   Temp 97.3 °F (36.3 °C) (Temporal)   Ht 160 cm (63\")   Wt 86.6 kg (191 lb)   SpO2 95%   BMI 33.83 kg/m²       Physical Exam  Vitals reviewed.   Constitutional:       General: She is not in acute distress.  HENT:      Head: Normocephalic and atraumatic.   Pulmonary:      Effort: Pulmonary effort is normal.   Abdominal:      General: There is no distension.      Palpations: Abdomen is soft. There is no mass.      Tenderness: There is abdominal tenderness in the left upper quadrant. There is left CVA tenderness. There is no right CVA tenderness.   Skin:     General: Skin is warm and dry.   Neurological:      General: No focal deficit present.      Mental Status: She is alert.      Motor: No weakness.   Psychiatric:         Thought Content: Thought content normal.             Assessment and Plan:  Diagnoses and all orders for this visit:    1. Abdominal pain, unspecified abdominal location (Primary)  -     HYDROcodone-acetaminophen (Norco) 5-325 " MG per tablet; Take 1 tablet by mouth Every 6 (Six) Hours As Needed for Moderate Pain or Severe Pain.  Dispense: 10 tablet; Refill: 0  -     POCT urinalysis dipstick, automated    2. Acute urinary tract infection    Other orders  -     levoFLOXacin (Levaquin) 750 MG tablet; Take 1 tablet by mouth Daily for 5 days.  Dispense: 5 tablet; Refill: 0      POCT: Urinalysis positive for +3 leukocytes and nitrite.  The patient is to stop Augmentin.  Start Levaquin 750 mg 1 p.o. daily with food.  She may use Norco 5-325 mg 1 tablet every 6 hours for moderate to severe pain.  Increase fluid intake and rest.  I have asked the patient to follow-up with oncology for increasing menstrual cramps.  Education on diagnosis, medication and treatment plan.        Patient was given instructions and counseling regarding condition.     Follow Up:  Return if symptoms worsen or fail to improve.

## 2023-05-04 ENCOUNTER — TELEPHONE (OUTPATIENT)
Dept: INTERNAL MEDICINE | Facility: CLINIC | Age: 28
End: 2023-05-04

## 2023-05-04 NOTE — TELEPHONE ENCOUNTER
Caller: SukhjinderStephanie guerra    Relationship: Self    Best call back number: [phone]    Requested Prescriptions:   HYDROcodone-acetaminophen (Norco) 5-325 MG per tablet    Pharmacy where request should be sent: [facility] [id number]  [place] - [address] [facility] [address] [facility] [phone]  - [phone] FX     Last office visit with prescribing clinician: 5/3/2023   Last telemedicine visit with prescribing clinician: Visit date not found   Next office visit with prescribing clinician: Visit date not found     Additional details provided by patient: PATIENT RECEIVED 5 PILLS 05/03/2023 BUT COULD NOT GET THE REST AS IT WAS ON BACK ORDER AND NOW THE REST OF THE AMOUNT[facility] [address]     Does the patient have less than a 3 day supply:  [x] Yes  [] No    Would you like a call back once the refill request has been completed: [x] Yes [] No    If the office needs to give you a call back, can they leave a voicemail: [x] Yes [] No    Xavier Rosario Rep   05/04/23 13:54 EDT

## 2023-05-09 ENCOUNTER — TELEPHONE (OUTPATIENT)
Dept: INTERNAL MEDICINE | Facility: CLINIC | Age: 28
End: 2023-05-09
Payer: MEDICAID

## 2023-05-09 DIAGNOSIS — R10.9 ABDOMINAL PAIN, UNSPECIFIED ABDOMINAL LOCATION: ICD-10-CM

## 2023-05-09 RX ORDER — HYDROCODONE BITARTRATE AND ACETAMINOPHEN 5; 325 MG/1; MG/1
1 TABLET ORAL EVERY 6 HOURS PRN
Qty: 10 TABLET | Refills: 0 | Status: SHIPPED | OUTPATIENT
Start: 2023-05-09

## 2023-06-29 PROBLEM — N94.6 DYSMENORRHEA: Status: ACTIVE | Noted: 2023-06-29

## 2023-07-26 ENCOUNTER — HOSPITAL ENCOUNTER (OUTPATIENT)
Dept: ULTRASOUND IMAGING | Facility: HOSPITAL | Age: 28
Discharge: HOME | End: 2023-07-26
Admitting: NURSE PRACTITIONER
Payer: MEDICAID

## 2023-07-26 DIAGNOSIS — N94.6 DYSMENORRHEA: ICD-10-CM

## 2023-07-26 PROCEDURE — 76830 TRANSVAGINAL US NON-OB: CPT

## 2023-08-22 ENCOUNTER — OFFICE VISIT (OUTPATIENT)
Dept: OBSTETRICS AND GYNECOLOGY | Facility: CLINIC | Age: 28
End: 2023-08-22
Payer: MEDICAID

## 2023-08-22 VITALS
BODY MASS INDEX: 35.44 KG/M2 | WEIGHT: 200 LBS | DIASTOLIC BLOOD PRESSURE: 80 MMHG | HEART RATE: 99 BPM | SYSTOLIC BLOOD PRESSURE: 116 MMHG

## 2023-08-22 DIAGNOSIS — N94.6 DYSMENORRHEA: Primary | ICD-10-CM

## 2023-08-22 RX ORDER — AMITRIPTYLINE HYDROCHLORIDE 25 MG/1
25-50 TABLET, FILM COATED ORAL
COMMUNITY
Start: 2023-08-03

## 2023-08-22 RX ORDER — MEFENAMIC ACID 250 MG/1
250 CAPSULE ORAL EVERY 6 HOURS PRN
Qty: 12 EACH | Refills: 5 | Status: SHIPPED | OUTPATIENT
Start: 2023-08-22 | End: 2023-08-25

## 2023-08-22 RX ORDER — CARIPRAZINE 1.5 MG/1
1 CAPSULE, GELATIN COATED ORAL DAILY
COMMUNITY
Start: 2023-08-05

## 2023-08-22 RX ORDER — CLONAZEPAM 1 MG/1
TABLET ORAL
COMMUNITY
Start: 2023-07-06

## 2023-08-22 NOTE — ASSESSMENT & PLAN NOTE
Reviewed ultrasound findings.  Discussed options to manage her cycle symptoms to include OCPs, Mirena IUD, mefenamic acid and consult for surgical options. Patient elects to try mefenamic acid for now, will call back if desires another option.

## 2023-08-22 NOTE — PROGRESS NOTES
GYN Visit    CC:   Chief Complaint   Patient presents with    Follow-up     Review ultrasound results        HPI:   28 y.o. Contraception or HRT: Contraception:  Tubal ligation    Here to review ultrasound findings and discuss menstrual pain.   Cycle started Saturday, cramping on both sides,  has had to take hydrocodone to manage her pain.    Reviewed ultrasound from 23 - normal uterus, endometrium and ovaries    US Non-ob Transvaginal (2023 16:59)  History: PMHx, Meds, Allergies, PSHx, Social Hx, and POBHx all reviewed and updated.    Review of Systems   Constitutional: Negative.    Genitourinary:  Positive for menstrual problem.     PHYSICAL EXAM:  /80   Pulse 99   Wt 90.7 kg (200 lb)   BMI 35.44 kg/mý      Physical Exam  Vitals and nursing note reviewed.   Constitutional:       Appearance: Normal appearance. She is well-developed and well-groomed.   Neurological:      Mental Status: She is alert.   Psychiatric:         Attention and Perception: Attention and perception normal.         Mood and Affect: Affect normal.         Speech: Speech normal.         Behavior: Behavior is cooperative.         Cognition and Memory: Cognition normal.       ASSESSMENT AND PLAN:  Diagnoses and all orders for this visit:    1. Dysmenorrhea (Primary)  Overview:  Will check ultrasound, patient does not want to use birth control to manage her cycle symptoms.    Assessment & Plan:  Reviewed ultrasound findings.  Discussed options to manage her cycle symptoms to include OCPs, Mirena IUD, mefenamic acid and consult for surgical options. Patient elects to try mefenamic acid for now, will call back if desires another option.    Orders:  -     Mefenamic Acid 250 MG capsule; Take 250 mg by mouth Every 6 (Six) Hours As Needed (pain) for up to 3 days.  Dispense: 12 each; Refill: 5        Counseling:  TRACK MENSES, RTO if <q21d, >7d long, heavy or painful.      Follow Up:  Return for  As needed.        Gurpreet Leon  Cr, APRN  08/22/2023    [facility] [address]  [facility]  [address] [place] [address]  Dept: [phone]  Dept Fax: [phone]  Loc: [phone]

## 2023-09-15 ENCOUNTER — TELEPHONE (OUTPATIENT)
Dept: OBSTETRICS AND GYNECOLOGY | Facility: CLINIC | Age: 28
End: 2023-09-15
Payer: MEDICAID

## 2023-09-15 DIAGNOSIS — N94.6 DYSMENORRHEA: Primary | ICD-10-CM

## 2023-09-15 RX ORDER — MEFENAMIC ACID 250 MG/1
1 CAPSULE ORAL EVERY 6 HOURS PRN
Qty: 12 EACH | Refills: 5 | Status: SHIPPED | OUTPATIENT
Start: 2023-09-15 | End: 2023-09-18

## 2023-09-15 NOTE — TELEPHONE ENCOUNTER
Patient called [facility] told her the medication you had sent in for her the mefenamic acid 250mg they are out of stock and unable to get it due to it being on back order for them. Patient would like to know if [facility] [address] please.

## 2023-10-03 ENCOUNTER — CLINICAL SUPPORT (OUTPATIENT)
Dept: INTERNAL MEDICINE | Facility: CLINIC | Age: 28
End: 2023-10-03
Payer: MEDICAID

## 2023-10-03 DIAGNOSIS — Z23 NEED FOR INFLUENZA VACCINATION: Primary | ICD-10-CM

## 2023-11-13 ENCOUNTER — TELEPHONE (OUTPATIENT)
Dept: OBSTETRICS AND GYNECOLOGY | Facility: CLINIC | Age: 28
End: 2023-11-13
Payer: MEDICAID

## 2023-11-13 NOTE — TELEPHONE ENCOUNTER
UNABLE TO WARM TRANSFER    EASTON CLEMONS    [phone]    PT IS HAVING SEVERE PELVIC PAINS, PT WAS PRESCRIBED MEFENAMIC (9/18/23)    PT STATES ITS NEVER WORKED FOR HER

## 2023-11-16 ENCOUNTER — OFFICE VISIT (OUTPATIENT)
Dept: OBSTETRICS AND GYNECOLOGY | Facility: CLINIC | Age: 28
End: 2023-11-16
Payer: MEDICAID

## 2023-11-16 VITALS
BODY MASS INDEX: 36.14 KG/M2 | SYSTOLIC BLOOD PRESSURE: 133 MMHG | HEIGHT: 63 IN | HEART RATE: 98 BPM | WEIGHT: 204 LBS | DIASTOLIC BLOOD PRESSURE: 84 MMHG

## 2023-11-16 DIAGNOSIS — N94.6 DYSMENORRHEA: Primary | ICD-10-CM

## 2023-11-16 RX ORDER — CLONAZEPAM 2 MG/1
TABLET ORAL
COMMUNITY
Start: 2023-10-30

## 2023-11-16 NOTE — ASSESSMENT & PLAN NOTE
Patient here for follow up.  States mefenamic acid has not help her cycle pain.  Is not interested in any other medical management, desires to discuss endometrial ablation.  Discussed a consult is not a guarantee of surgery, verbalizes understanding.  Will schedule.  Will send vaginal infection screen

## 2023-11-16 NOTE — PROGRESS NOTES
"GYN Visit    CC:   Chief Complaint   Patient presents with    Pelvic Pain       HPI:   28 y.o. Contraception or HRT: Contraception:  Tubal ligation    Here for follow up on painful cycles.  States mem acid is not helping at all.  Denies any new partners or concerns for sexually transmitted infections.    Denies pain with intercourse or bowel movements.     Wants to discuss endometrial ablation.  Declines medical management today.       History: PMHx, Meds, Allergies, PSHx, Social Hx, and POBHx all reviewed and updated.    Review of Systems   Constitutional: Negative.    Genitourinary:  Positive for menstrual problem.       PHYSICAL EXAM:  /84   Pulse 98   Ht 160 cm (63\")   Wt 92.5 kg (204 lb)   LMP 2023 (Exact Date)   BMI 36.14 kg/m²      Physical Exam  Vitals and nursing note reviewed. Exam conducted with a chaperone present.   Constitutional:       Appearance: Normal appearance. She is well-developed and well-groomed.   HENT:      Head: Normocephalic.   Eyes:      Pupils: Pupils are equal, round, and reactive to light.   Genitourinary:     General: Normal vulva.      Exam position: Lithotomy position.      Pubic Area: No rash or pubic lice.       Labia:         Right: No rash, tenderness, lesion or injury.         Left: No rash, tenderness, lesion or injury.       Vagina: Normal. No foreign body. No vaginal discharge, erythema, tenderness, bleeding or lesions.      Cervix: No cervical motion tenderness or discharge.   Skin:     General: Skin is warm and dry.   Neurological:      General: No focal deficit present.      Mental Status: She is alert.         ASSESSMENT AND PLAN:  Diagnoses and all orders for this visit:    1. Dysmenorrhea (Primary)  Overview:  Will check ultrasound, patient does not want to use birth control to manage her cycle symptoms.    Assessment & Plan:  Patient here for follow up.  States mefenamic acid has not help her cycle pain.  Is not interested in any other medical " management, desires to discuss endometrial ablation.  Discussed a consult is not a guarantee of surgery, verbalizes understanding.  Will schedule.  Will send vaginal infection screen    Orders:  -     NuSwab VG+ - Swab, Cervix        Counseling:  Will schedule consult    Follow Up:  Return for [facility], dysmenorrhea consult, desires to discuss surgical management.        Gurpreet Hankins, APRN  11/16/2023    [facility] [address]  [facility]  [address] [address]  Dept: [phone]  Dept Fax: [phone]  Loc: [phone]

## 2023-11-20 LAB
A VAGINAE DNA VAG QL NAA+PROBE: ABNORMAL SCORE
BVAB2 DNA VAG QL NAA+PROBE: ABNORMAL SCORE
C ALBICANS DNA VAG QL NAA+PROBE: NEGATIVE
C GLABRATA DNA VAG QL NAA+PROBE: NEGATIVE
C TRACH DNA VAG QL NAA+PROBE: NEGATIVE
MEGA1 DNA VAG QL NAA+PROBE: ABNORMAL SCORE
N GONORRHOEA DNA VAG QL NAA+PROBE: NEGATIVE
T VAGINALIS DNA VAG QL NAA+PROBE: NEGATIVE

## 2023-11-21 ENCOUNTER — TELEPHONE (OUTPATIENT)
Dept: OBSTETRICS AND GYNECOLOGY | Facility: CLINIC | Age: 28
End: 2023-11-21
Payer: MEDICAID

## 2023-11-21 DIAGNOSIS — N76.0 ACUTE VAGINITIS: Primary | ICD-10-CM

## 2023-11-21 RX ORDER — METRONIDAZOLE 500 MG/1
500 TABLET ORAL 2 TIMES DAILY
Qty: 14 TABLET | Refills: 0 | Status: SHIPPED | OUTPATIENT
Start: 2023-11-21 | End: 2023-11-28

## 2023-11-21 NOTE — TELEPHONE ENCOUNTER
----- Message from LELE Bhatt sent at 11/21/2023  9:04 AM EST -----  Please let patient know her swab shows BV, will send prescription to her pharmacy.

## 2023-12-06 ENCOUNTER — TELEPHONE (OUTPATIENT)
Dept: OBSTETRICS AND GYNECOLOGY | Facility: CLINIC | Age: 28
End: 2023-12-06

## 2023-12-06 NOTE — TELEPHONE ENCOUNTER
Called patient rescheduled appointment    Paramedian Forehead Flap Text: A decision was made to reconstruct the defect utilizing an interpolation axial flap and a staged reconstruction.  A telfa template was made of the defect.  This telfa template was then used to outline the paramedian forehead pedicle flap.  The donor area for the pedicle flap was then injected with anesthesia.  The flap was excised through the skin and subcutaneous tissue down to the layer of the underlying musculature.  The pedicle flap was carefully excised within this deep plane to maintain its blood supply.  The edges of the donor site were undermined.   The donor site was closed in a primary fashion.  The pedicle was then rotated into position and sutured.  Once the tube was sutured into place, adequate blood supply was confirmed with blanching and refill.  The pedicle was then wrapped with xeroform gauze and dressed appropriately with a telfa and gauze bandage to ensure continued blood supply and protect the attached pedicle.

## 2023-12-27 NOTE — PROGRESS NOTES
"Chief Complaint  Cough (The patient has been exposed to RSV on . She states e is having coughing and chest pain, congestion, runny nose. )  Subjective      History of Present Illness  Stephanie Slaughter is a 28 y.o. female who presents to [facility] for an acute visit for complaint of cough, congestion and sore throat for 5 days. Daughter has RSV.  Also, patient reports that she wants a second opinion on pelvic pain.     Review of Systems  Constitutional: Negative for loss of appetite. Positive fever.   HEENT: Negative for earache.   Respiratory: Positive for wheezing and chest tightness. Negative for dyspnea.   Gastrointestinal: Negative for nausea, vomiting and diarrhea.       Past Medical History:   Diagnosis Date    ADHD (attention deficit hyperactivity disorder)     Anxiety     Depression     Heart abnormality         Past Surgical History:   Procedure Laterality Date    CARDIAC SURGERY       SECTION      EYE SURGERY      TUBAL ABDOMINAL LIGATION      WISDOM TOOTH EXTRACTION          Allergies   Allergen Reactions    Oseltamivir Phosphate Other (See Comments)     \"tamiflu\"  hallucinations          Current Outpatient Medications:     Adderall XR 30 MG 24 hr capsule, Take 1 capsule by mouth Daily, Disp: , Rfl:     ALPRAZolam (XANAX) 0.5 MG tablet, Take 1 tablet by mouth Daily As Needed for Anxiety., Disp: , Rfl:     amphetamine-dextroamphetamine (ADDERALL) 20 MG tablet, TAKE 1 TABLET BY MOUTH EVERY AFTERNOON, Disp: , Rfl:     aspirin 81 MG chewable tablet, Chew 1 tablet Daily., Disp: , Rfl:     dicyclomine (BENTYL) 10 MG capsule, , Disp: , Rfl:     doxepin (SINEquan) 10 MG capsule, Take 1 capsule by mouth Every Night., Disp: , Rfl:     HYDROcodone-acetaminophen (Norco) 5-325 MG per tablet, Take 1 tablet by mouth Every 6 (Six) Hours As Needed for Moderate Pain or Severe Pain., Disp: 10 tablet, Rfl: 0    propranolol (INDERAL) 10 MG tablet, Take 1 tablet by mouth 3 " "(Three) Times a Day As Needed., Disp: , Rfl:     valACYclovir (Valtrex) 1000 MG tablet, Take 1 tablet by mouth 2 (Two) Times a Day., Disp: 20 tablet, Rfl: 0    Vraylar 1.5 MG capsule capsule, Take 1 capsule by mouth Daily., Disp: , Rfl:     albuterol sulfate  (90 Base) MCG/ACT inhaler, Inhale 2 puffs Every 4 (Four) Hours As Needed for Wheezing. Use with space chamber., Disp: 1 g, Rfl: 1    brompheniramine-pseudoephedrine-DM 30-2-10 MG/5ML syrup, Take 5 mL by mouth 4 (Four) Times a Day As Needed for Congestion or Cough., Disp: 118 mL, Rfl: 0    Objective   /80 (BP Location: Right arm, Patient Position: Sitting, Cuff Size: Adult)   Pulse 104   Temp 97.5 °F (36.4 °C) (Temporal)   Ht 160 cm (63\")   Wt 93.3 kg (205 lb 9.6 oz)   SpO2 92%   BMI 36.42 kg/m²    Estimated body mass index is 36.42 kg/m² as calculated from the following:    Height as of this encounter: 160 cm (63\").    Weight as of this encounter: 93.3 kg (205 lb 9.6 oz).   Physical Exam  Constitutional:       General: She is not in acute distress.  HENT:      Head: Normocephalic and atraumatic.   Cardiovascular:      Rate and Rhythm: Normal rate and regular rhythm.      Heart sounds: Normal heart sounds.   Pulmonary:      Breath sounds: Normal breath sounds. No wheezing, rhonchi or rales.   Lymphadenopathy:      Cervical: No cervical adenopathy.   Neurological:      General: No focal deficit present.      Mental Status: She is alert.               Assessment and Plan   Diagnoses and all orders for this visit:    1. Bronchial infection (Primary)    2. Pelvic pain  -     Ambulatory Referral to Gynecology    3. Dysmenorrhea  -     Ambulatory Referral to Gynecology    Other orders  -     brompheniramine-pseudoephedrine-DM 30-2-10 MG/5ML syrup; Take 5 mL by mouth 4 (Four) Times a Day As Needed for Congestion or Cough.  Dispense: 118 mL; Refill: 0  -     albuterol sulfate  (90 Base) MCG/ACT inhaler; Inhale 2 puffs Every 4 (Four) Hours As " Needed for Wheezing. Use with space chamber.  Dispense: 1 g; Refill: 1    Covid and flu test today are negative. Patient exposed to RSV and may have it as well. Declines the test. Treat for cough. Mucinex DM as directed or Bromfed DM.  Depo Medrol 60 mg IM today.   Education on diagnosis, medication and treatment plan.  Patient was given instructions and counseling regarding her condition. Please see specific information pulled into the AVS if appropriate.     Follow Up   If not improving or worsening the patient was instructed to follow-up.    Dictated Utilizing Dragon Dictation.  Please note that portions of this note were completed with a voice recognition program.  Part of this note may be an electronic transcription/translation of spoken language to printed text using the Dragon Dictation System.    LELE Moreira

## 2023-12-29 ENCOUNTER — OFFICE VISIT (OUTPATIENT)
Dept: INTERNAL MEDICINE | Facility: CLINIC | Age: 28
End: 2023-12-29
Payer: MEDICAID

## 2023-12-29 VITALS
DIASTOLIC BLOOD PRESSURE: 80 MMHG | HEART RATE: 104 BPM | TEMPERATURE: 97.5 F | WEIGHT: 205.6 LBS | BODY MASS INDEX: 36.43 KG/M2 | OXYGEN SATURATION: 92 % | HEIGHT: 63 IN | SYSTOLIC BLOOD PRESSURE: 100 MMHG

## 2023-12-29 DIAGNOSIS — J40 BRONCHIAL INFECTION: Primary | ICD-10-CM

## 2023-12-29 DIAGNOSIS — R10.2 PELVIC PAIN: ICD-10-CM

## 2023-12-29 DIAGNOSIS — N94.6 DYSMENORRHEA: ICD-10-CM

## 2023-12-29 LAB
EXPIRATION DATE: NORMAL
FLUAV AG UPPER RESP QL IA.RAPID: NOT DETECTED
FLUBV AG UPPER RESP QL IA.RAPID: NOT DETECTED
INTERNAL CONTROL: NORMAL
Lab: NORMAL
SARS-COV-2 AG UPPER RESP QL IA.RAPID: NOT DETECTED

## 2023-12-29 PROCEDURE — 99214 OFFICE O/P EST MOD 30 MIN: CPT | Performed by: NURSE PRACTITIONER

## 2023-12-29 PROCEDURE — 1160F RVW MEDS BY RX/DR IN RCRD: CPT | Performed by: NURSE PRACTITIONER

## 2023-12-29 PROCEDURE — 1159F MED LIST DOCD IN RCRD: CPT | Performed by: NURSE PRACTITIONER

## 2023-12-29 RX ORDER — DOXEPIN HYDROCHLORIDE 10 MG/1
10 CAPSULE ORAL NIGHTLY
COMMUNITY
Start: 2023-12-19

## 2023-12-29 RX ORDER — ALPRAZOLAM 0.5 MG/1
0.5 TABLET ORAL DAILY PRN
COMMUNITY
Start: 2023-12-19

## 2023-12-29 RX ORDER — BROMPHENIRAMINE MALEATE, PSEUDOEPHEDRINE HYDROCHLORIDE, AND DEXTROMETHORPHAN HYDROBROMIDE 2; 30; 10 MG/5ML; MG/5ML; MG/5ML
5 SYRUP ORAL 4 TIMES DAILY PRN
Qty: 118 ML | Refills: 0 | Status: SHIPPED | OUTPATIENT
Start: 2023-12-29

## 2023-12-29 RX ORDER — ALBUTEROL SULFATE 90 UG/1
2 AEROSOL, METERED RESPIRATORY (INHALATION) EVERY 4 HOURS PRN
Qty: 1 G | Refills: 1 | Status: SHIPPED | OUTPATIENT
Start: 2023-12-29

## 2024-01-24 ENCOUNTER — TELEPHONE (OUTPATIENT)
Dept: OBSTETRICS AND GYNECOLOGY | Facility: CLINIC | Age: 29
End: 2024-01-24
Payer: MEDICAID

## 2024-01-24 ENCOUNTER — OFFICE VISIT (OUTPATIENT)
Dept: OBSTETRICS AND GYNECOLOGY | Facility: CLINIC | Age: 29
End: 2024-01-24
Payer: MEDICAID

## 2024-01-24 VITALS
BODY MASS INDEX: 35.78 KG/M2 | HEART RATE: 93 BPM | SYSTOLIC BLOOD PRESSURE: 105 MMHG | DIASTOLIC BLOOD PRESSURE: 73 MMHG | WEIGHT: 202 LBS

## 2024-01-24 DIAGNOSIS — N94.89 HIGH-TONE PELVIC FLOOR DYSFUNCTION: ICD-10-CM

## 2024-01-24 DIAGNOSIS — Z12.4 PAP SMEAR FOR CERVICAL CANCER SCREENING: ICD-10-CM

## 2024-01-24 DIAGNOSIS — R10.2 PELVIC PAIN: Primary | ICD-10-CM

## 2024-01-24 DIAGNOSIS — N94.6 DYSMENORRHEA: ICD-10-CM

## 2024-01-24 PROCEDURE — G0123 SCREEN CERV/VAG THIN LAYER: HCPCS

## 2024-01-24 PROCEDURE — 87624 HPV HI-RISK TYP POOLED RSLT: CPT

## 2024-01-24 RX ORDER — ALPRAZOLAM 1 MG/1
1 TABLET ORAL DAILY PRN
COMMUNITY
Start: 2024-01-18

## 2024-01-24 NOTE — ASSESSMENT & PLAN NOTE
Pt states she had a BTL at the time of c/s 2 years prior.  Since that time she has had right sided pain with menses.  In the past 6 months she now has bilateral low pelvic pain that isn't related to menses, but worse with menses.    Doesn't tolerate OCPs, used a Mirena IUD in the past and didn't tolerate it well.  Removed secondary to dyspareunia  Feels better off OCPs  Pt also has low back pain and some jaw clenching.  She denies constipation.    On exam she does have signs of high tone pelvic floor dysfunction

## 2024-01-24 NOTE — PROGRESS NOTES
GYN Visit    CC: pelvic pain    HPI:   28 y.o. Contraception or HRT: Contraception:  Tubal ligation        Pt has multiple concerns she would like to discuss      History: PMHx, Meds, Allergies, PSHx, Social Hx, and POBHx all reviewed and updated.  Physical Exam   PHYSICAL EXAM:  /73   Pulse 93   Wt 91.6 kg (202 lb)   LMP 2024 (Approximate)   Breastfeeding No   BMI 35.78 kg/m²   General- NAD, alert and oriented, appropriate  Psych- Normal mood, good memory    External genitalia- Normal female, no lesions  Urethra/meatus- Normal, no masses, non tender, no prolapse  Bladder- Normal, no masses, non tender, no prolapse  Vagina- Normal, no atrophy, no lesions, no discharge, no prolapse, floor of the pelvis with bilateral tenderness and increased tone obturator internus more significant on the right side than the left side  Cvx- Normal, no lesions, no discharge, No cervical motion tenderness  Uterus- Normal size, shape & consistency.  Non tender, mobile.  Adnexa- No mass, non tender  Anus/Rectum/Perineum- Not performed    Lymphatic- No palpable neck, axillary, or groin nodes  Ext- No edema, no cyanosis    Skin- No lesions, no rashes, no acanthosis nigricans        ASSESSMENT AND PLAN:  Diagnoses and all orders for this visit:    1. Pelvic pain (Primary)  Assessment & Plan:  Pt states she had a BTL at the time of c/s 2 years prior.  Since that time she has had right sided pain with menses.  In the past 6 months she now has bilateral low pelvic pain that isn't related to menses, but worse with menses.    Doesn't tolerate OCPs, used a Mirena IUD in the past and didn't tolerate it well.  Removed secondary to dyspareunia  Feels better off OCPs  Pt also has low back pain and some jaw clenching.  She denies constipation.    On exam she does have signs of high tone pelvic floor dysfunction      Orders:  -     US Pelvis Transvaginal Non OB; Future    2. Dysmenorrhea  Overview:  Will check ultrasound,  patient does not want to use birth control to manage her cycle symptoms.    Assessment & Plan:  Pt states she began having significant pain with menses after her BTL 2 years earlier.  BTL was done at the time of c/s.  She states initially the pain was more right sided.  Pain is now throughout the pelvis and no longer related only to menses    Orders:  -     US Pelvis Transvaginal Non OB; Future    3. High-tone pelvic floor dysfunction  Assessment & Plan:  Pt with c/o pelvic pain initially related only to menses and more right sided.  Now pain is throughout the pelvis and happens independent of menses.  She denies any dyspareunia.  She does state she has lower back pain, jaw clenching at the same time.  She denies constipation.  On exam she has bilateral tenderness and increased tone bilateral obturator internus muscles, right side more tender than left side.  Uterus has mobility  Will ref to pelvic floor pelvic floor PT    Orders:  -     Ambulatory Referral to Physical Therapy Evaluate and treat, Pelvic Floor    4. Pap smear for cervical cancer screening  -     IgP, Aptima HPV        Counseling:  Discussed how I think high tone pelvic floor dysfunction could be the etiology of her pelvic pain.  I do recommend repeating pelvic ultrasound and pursuing pelvic floor physical therapy.  If patient does not have significant improvement at that time I would recommend diagnostic laparoscopy.        Follow Up:  Return for post ultrasound.          Sandrine Iraheta MD  01/24/2024

## 2024-01-25 ENCOUNTER — TELEPHONE (OUTPATIENT)
Dept: OBSTETRICS AND GYNECOLOGY | Facility: CLINIC | Age: 29
End: 2024-01-25
Payer: MEDICAID

## 2024-01-25 PROBLEM — M62.89 HIGH-TONE PELVIC FLOOR DYSFUNCTION: Status: ACTIVE | Noted: 2024-01-25

## 2024-01-25 PROBLEM — N94.89 HIGH-TONE PELVIC FLOOR DYSFUNCTION: Status: ACTIVE | Noted: 2024-01-25

## 2024-01-25 PROBLEM — A54.9 GONORRHEA: Status: RESOLVED | Noted: 2023-03-27 | Resolved: 2024-01-25

## 2024-01-25 NOTE — ASSESSMENT & PLAN NOTE
Pt states she began having significant pain with menses after her BTL 2 years earlier.  BTL was done at the time of c/s.  She states initially the pain was more right sided.  Pain is now throughout the pelvis and no longer related only to menses   5

## 2024-01-25 NOTE — ASSESSMENT & PLAN NOTE
Pt with c/o pelvic pain initially related only to menses and more right sided.  Now pain is throughout the pelvis and happens independent of menses.  She denies any dyspareunia.  She does state she has lower back pain, jaw clenching at the same time.  She denies constipation.  On exam she has bilateral tenderness and increased tone bilateral obturator internus muscles, right side more tender than left side.  Uterus has mobility  Will ref to pelvic floor pelvic floor PT

## 2024-01-31 LAB
CYTOLOGIST CVX/VAG CYTO: ABNORMAL
CYTOLOGY CVX/VAG DOC CYTO: ABNORMAL
CYTOLOGY CVX/VAG DOC THIN PREP: ABNORMAL
DX ICD CODE: ABNORMAL
DX ICD CODE: ABNORMAL
HIV 1 & 2 AB SER-IMP: ABNORMAL
HPV I/H RISK 4 DNA CVX QL PROBE+SIG AMP: POSITIVE
OTHER STN SPEC: ABNORMAL
PATHOLOGIST CVX/VAG CYTO: ABNORMAL
STAT OF ADQ CVX/VAG CYTO-IMP: ABNORMAL

## 2024-02-02 ENCOUNTER — TELEPHONE (OUTPATIENT)
Dept: OBSTETRICS AND GYNECOLOGY | Facility: CLINIC | Age: 29
End: 2024-02-02
Payer: MEDICAID

## 2024-02-02 NOTE — TELEPHONE ENCOUNTER
----- Message from Sandrine Iraheta MD sent at 1/31/2024 11:52 AM EST -----  Please call the patient regarding her abnormal result. Please schedule colposcopy

## 2024-02-26 ENCOUNTER — TELEPHONE (OUTPATIENT)
Dept: INTERNAL MEDICINE | Facility: CLINIC | Age: 29
End: 2024-02-26
Payer: MEDICAID

## 2024-02-26 RX ORDER — NAPROXEN SODIUM 550 MG/1
550 TABLET ORAL 2 TIMES DAILY WITH MEALS
Qty: 30 TABLET | Refills: 0 | Status: SHIPPED | OUTPATIENT
Start: 2024-02-26

## 2024-02-26 NOTE — TELEPHONE ENCOUNTER
Caller: Stephanie Slaughter    Relationship: Self    Best call back number: [phone]    What medication are you requesting: PAIN MEDICATION     What are your current symptoms: SEVERE PAIN WHILE ON PERIOD     How long have you been experiencing symptoms: YEARS     Have you had these symptoms before:    [x] Yes  [] No    Have you been treated for these symptoms before:   [x] Yes  [] No    If a prescription is needed, what is your preferred pharmacy and phone number: [id number] - [place] - [address] [facility] [phone] [facility] [phone] [place]     Additional notes: PATIENT DOES HAVE AN ULTRASOUND SCHEDULED WITH HER OBGYN BUT WAS TOLD UNTIL THAT TEST WAS COMPLETED AND ALL RESULTS CAME BACK THE PROVIDER WAS UNABLE TO PRESCRIBE HER ANY MEDICATION TO HELP WITH PAIN.

## 2024-03-04 NOTE — PROGRESS NOTES
Colposcopy    Pregnant: No  Birth control: Sterilization/tubal PERFORMED AT [facility]  Tobacco/Nicotine use:  Yes  Pap result: ASCUS, HPV HIGH RISK POSITIVE- non specified type  Uhcg:  Negative  Consent signed: Yes    CC: Presents for colposcopy     Procedure reviewed in detail.  She understands the potential risks include, but are not limited to, pain, bleeding, and infection.  Her questions have been answered.        Subjective:  No complaints    Objective:  /76   Pulse 93   Wt 93 kg (205 lb)   LMP 02/24/2024   Breastfeeding No   BMI 36.31 kg/m²   External genitalia- Without lesion    Perineum- Without lesion  Vagina- Without lesion   Cvx- Adequate 100%TZ seen, ECC performed, No lesions seen, Hemostatic    Physical Exam (pics..)    Assessment and Plan:  Diagnoses and all orders for this visit:    1. Atypical squamous cells of undetermined significance on cytologic smear of cervix (ASC-US) (Primary)  -     Tissue Pathology Exam    2. HPV (human papilloma virus) infection  -     Tissue Pathology Exam    3. Pregnancy test negative  -     POC Pregnancy, Urine          Counseling:    We discussed her Pap diagnosis and HPV in detail.  HPV incidence, transmission, course, remission, progression, types, cervical cancer, genital warts, monitoring, and importance of compliance were reviewed.  Importance of maintaining a healthy lifestyle    She understands the need for continued follow up until she is cleared to return to routine screening pap smears.  She understands the importance of follow up and consequences with lack of follow up (i.e. potential for cervical cancer).  Follow up usually ranges every 6months - 12months.      PRECAUTIONS - It is common to have bright red spotting and dark discharge after today.  If she has had cervical biopsies, she is to avoid intercourse or tampons as directed for 7 days.  She can use OTC pain relievers prn.  She needs to return to office or ER (if after hours/weekends) if she has  pelvic pain, bleeding > 1 pad/2 hours, discharge that has a bad vaginal odor or vaginal itching, fever > 101.5, or any other concerns.            Follow Up:  Return in about 2 weeks (around 3/20/2024).      Sandrine Iraheta MD  03/06/2024

## 2024-03-05 ENCOUNTER — HOSPITAL ENCOUNTER (OUTPATIENT)
Dept: ULTRASOUND IMAGING | Facility: HOSPITAL | Age: 29
Discharge: HOME | End: 2024-03-05
Admitting: OBSTETRICS & GYNECOLOGY
Payer: MEDICAID

## 2024-03-05 DIAGNOSIS — R10.2 PELVIC PAIN: ICD-10-CM

## 2024-03-05 DIAGNOSIS — N94.6 DYSMENORRHEA: ICD-10-CM

## 2024-03-05 PROCEDURE — 76830 TRANSVAGINAL US NON-OB: CPT

## 2024-03-05 PROCEDURE — 76856 US EXAM PELVIC COMPLETE: CPT

## 2024-03-06 ENCOUNTER — PROCEDURE VISIT (OUTPATIENT)
Dept: OBSTETRICS AND GYNECOLOGY | Facility: CLINIC | Age: 29
End: 2024-03-06
Payer: MEDICAID

## 2024-03-06 VITALS
SYSTOLIC BLOOD PRESSURE: 109 MMHG | BODY MASS INDEX: 36.31 KG/M2 | DIASTOLIC BLOOD PRESSURE: 76 MMHG | WEIGHT: 205 LBS | HEART RATE: 93 BPM

## 2024-03-06 DIAGNOSIS — B97.7 HPV (HUMAN PAPILLOMA VIRUS) INFECTION: ICD-10-CM

## 2024-03-06 DIAGNOSIS — Z32.02 PREGNANCY TEST NEGATIVE: ICD-10-CM

## 2024-03-06 DIAGNOSIS — R87.610 ATYPICAL SQUAMOUS CELLS OF UNDETERMINED SIGNIFICANCE ON CYTOLOGIC SMEAR OF CERVIX (ASC-US): Primary | ICD-10-CM

## 2024-03-06 LAB
B-HCG UR QL: NEGATIVE
EXPIRATION DATE: NORMAL
INTERNAL NEGATIVE CONTROL: NORMAL
INTERNAL POSITIVE CONTROL: NORMAL
Lab: NORMAL

## 2024-03-06 PROCEDURE — 88305 TISSUE EXAM BY PATHOLOGIST: CPT | Performed by: OBSTETRICS & GYNECOLOGY

## 2024-03-09 LAB
CYTO UR: NORMAL
LAB AP CASE REPORT: NORMAL
LAB AP CLINICAL INFORMATION: NORMAL
PATH REPORT.FINAL DX SPEC: NORMAL
PATH REPORT.GROSS SPEC: NORMAL

## 2024-03-11 ENCOUNTER — TELEPHONE (OUTPATIENT)
Dept: OBSTETRICS AND GYNECOLOGY | Facility: CLINIC | Age: 29
End: 2024-03-11
Payer: MEDICAID

## 2024-03-11 NOTE — TELEPHONE ENCOUNTER
Patient has a follow up from [facility] on 03/18. Does she need to keep this?

## 2024-03-11 NOTE — TELEPHONE ENCOUNTER
----- Message from Sandrine Iraheta MD sent at 3/11/2024  9:18 AM EDT -----  Please call the patient regarding her abnormal result.  Biopsy is mild dysplasia.  Will repeat pap smear in one year

## 2024-06-11 ENCOUNTER — LAB REQUISITION (OUTPATIENT)
Dept: LAB | Facility: CLINIC | Age: 29
End: 2024-06-11

## 2024-06-11 LAB
PATH REPORT.COMMENTS IMP SPEC: NORMAL
PATH REPORT.FINAL DX SPEC: NORMAL
PATH REPORT.GROSS SPEC: NORMAL
PATH REPORT.MICROSCOPIC SPEC OTHER STN: NORMAL
PATH REPORT.RELEVANT HX SPEC: NORMAL
PATH REPORT.RELEVANT HX SPEC: NORMAL
PATH REPORT.SITE OF ORIGIN SPEC: NORMAL

## 2024-07-08 NOTE — PROGRESS NOTES
GYN Visit    CC: GLENN 1    HPI:   29 y.o. Contraception or HRT: Contraception:  Tubal ligation  Patient is continuing to have severe dysmenorrhea and chronic pelvic pain      History: PMHx, Meds, Allergies, PSHx, Social Hx, and POBHx all reviewed and updated.  Physical Exam   PHYSICAL EXAM:  /77   Pulse 94   Wt 94.8 kg (209 lb)   LMP 06/15/2024 (Exact Date)   Breastfeeding No   BMI 37.02 kg/m²   General- NAD, alert and oriented, appropriate  Psych- Normal mood, good memory    Tissue Pathology Exam (2024 14:27)    IgP, Aptima HPV (2024 15:26)    ASSESSMENT AND PLAN:  Diagnoses and all orders for this visit:    1. Dysplasia of cervix, low grade (GLENN 1) (Primary)  Assessment & Plan:  Patient's colposcopy and biopsy confirmed GLENN-1.  Will repeat Pap smear with cotesting in 1 year      2. HPV (human papilloma virus) infection  -     HPV Vaccine (HPV9)    3. Pelvic pain  Assessment & Plan:  Patient was counseled at length regarding diagnostic laparoscopy.  We discussed the risks benefits of surgery.  Discussed that the laparoscopy could be normal and not able to identify an etiology for her pain.  All questions were answered and informed consent was obtained    Orders:  -     Case Request; Standing  -     sodium chloride 0.9 % flush 3 mL  -     sodium chloride 0.9 % flush 10 mL  -     sodium chloride 0.9 % infusion 40 mL  -     lactated ringers infusion  -     acetaminophen (TYLENOL) tablet 1,000 mg  -     Pregnancy, Urine - Urine, Clean Catch; Future  -     Case Request    4. Dysmenorrhea  Assessment & Plan:  Patient continues to have dysmenorrhea.  Patient was counseled at length regarding the risks benefits of diagnostic laparoscopy    Orders:  -     Case Request; Standing  -     sodium chloride 0.9 % flush 3 mL  -     sodium chloride 0.9 % flush 10 mL  -     sodium chloride 0.9 % infusion 40 mL  -     lactated ringers infusion  -     acetaminophen (TYLENOL) tablet 1,000 mg  -      Pregnancy, Urine - Urine, Clean Catch; Future  -     Case Request    5. Screen for STD (sexually transmitted disease)  -     Chlamydia trachomatis, Neisseria gonorrhoeae, PCR - Swab, Cervix  -     Gardnerella vaginalis, Trichomonas vaginalis, Candida albicans, DNA - Swab, Vagina; Future  -     Gardnerella vaginalis, Trichomonas vaginalis, Candida albicans, DNA - Swab, Vagina    Other orders  -     Code Status and Medical Interventions:; Standing  -     Follow Anesthesia Guidelines / Protocol; Future  -     Follow Anesthesia Guidelines / Protocol; Standing  -     Obtain Informed Consent; Standing  -     Verify / Perform Chlorhexidine Skin Prep; Standing  -     CBC & Differential; Standing  -     Urinalysis With Culture If Indicated -; Standing  -     Insert Peripheral IV; Standing  -     Saline Lock & Maintain IV Access; Standing  -     Place Sequential Compression Device; Standing  -     Maintain Sequential Compression Device; Standing        Counseling: Pt was counseled at length regarding the risks and benefits of surgery.  The risks include but are not limited to the risk of anesthesia, the risk of bleeding, the risk of infection, the risk of injury to the bowel, bladder, ureters and any surrounding structures.  Risks also include possibility of needing future surgery to correct an issue as a result of the first surgery.  All questions were answered and informed consent was obtained.         Follow Up:  Return for 2 weeks post op.          Sandrine Iraheta MD  07/09/2024

## 2024-07-09 ENCOUNTER — OFFICE VISIT (OUTPATIENT)
Dept: OBSTETRICS AND GYNECOLOGY | Facility: CLINIC | Age: 29
End: 2024-07-09
Payer: MEDICAID

## 2024-07-09 VITALS
HEART RATE: 94 BPM | SYSTOLIC BLOOD PRESSURE: 110 MMHG | DIASTOLIC BLOOD PRESSURE: 77 MMHG | WEIGHT: 209 LBS | BODY MASS INDEX: 37.02 KG/M2

## 2024-07-09 DIAGNOSIS — N87.0 DYSPLASIA OF CERVIX, LOW GRADE (CIN 1): Primary | ICD-10-CM

## 2024-07-09 DIAGNOSIS — B97.7 HPV (HUMAN PAPILLOMA VIRUS) INFECTION: ICD-10-CM

## 2024-07-09 DIAGNOSIS — R10.2 PELVIC PAIN: ICD-10-CM

## 2024-07-09 DIAGNOSIS — N94.6 DYSMENORRHEA: ICD-10-CM

## 2024-07-09 DIAGNOSIS — Z11.3 SCREEN FOR STD (SEXUALLY TRANSMITTED DISEASE): ICD-10-CM

## 2024-07-09 PROCEDURE — 99213 OFFICE O/P EST LOW 20 MIN: CPT | Performed by: OBSTETRICS & GYNECOLOGY

## 2024-07-09 PROCEDURE — 87510 GARDNER VAG DNA DIR PROBE: CPT | Performed by: OBSTETRICS & GYNECOLOGY

## 2024-07-09 PROCEDURE — 1159F MED LIST DOCD IN RCRD: CPT | Performed by: OBSTETRICS & GYNECOLOGY

## 2024-07-09 PROCEDURE — 87660 TRICHOMONAS VAGIN DIR PROBE: CPT | Performed by: OBSTETRICS & GYNECOLOGY

## 2024-07-09 PROCEDURE — 87491 CHLMYD TRACH DNA AMP PROBE: CPT | Performed by: OBSTETRICS & GYNECOLOGY

## 2024-07-09 PROCEDURE — 87480 CANDIDA DNA DIR PROBE: CPT | Performed by: OBSTETRICS & GYNECOLOGY

## 2024-07-09 PROCEDURE — 1160F RVW MEDS BY RX/DR IN RCRD: CPT | Performed by: OBSTETRICS & GYNECOLOGY

## 2024-07-09 PROCEDURE — 87591 N.GONORRHOEAE DNA AMP PROB: CPT | Performed by: OBSTETRICS & GYNECOLOGY

## 2024-07-09 RX ORDER — SODIUM CHLORIDE 0.9 % (FLUSH) 0.9 %
10 SYRINGE (ML) INJECTION AS NEEDED
OUTPATIENT
Start: 2024-07-09

## 2024-07-09 RX ORDER — ACETAMINOPHEN 500 MG
1000 TABLET ORAL ONCE
OUTPATIENT
Start: 2024-07-09 | End: 2024-07-09

## 2024-07-09 RX ORDER — LORAZEPAM 1 MG/1
1 TABLET ORAL EVERY 12 HOURS SCHEDULED
COMMUNITY
Start: 2024-07-05

## 2024-07-09 RX ORDER — SODIUM CHLORIDE, SODIUM LACTATE, POTASSIUM CHLORIDE, CALCIUM CHLORIDE 600; 310; 30; 20 MG/100ML; MG/100ML; MG/100ML; MG/100ML
125 INJECTION, SOLUTION INTRAVENOUS CONTINUOUS
OUTPATIENT
Start: 2024-07-09

## 2024-07-09 RX ORDER — MIRTAZAPINE 7.5 MG/1
TABLET, FILM COATED ORAL
COMMUNITY
Start: 2024-07-05

## 2024-07-09 RX ORDER — SODIUM CHLORIDE 0.9 % (FLUSH) 0.9 %
3 SYRINGE (ML) INJECTION EVERY 12 HOURS SCHEDULED
OUTPATIENT
Start: 2024-07-09

## 2024-07-09 RX ORDER — SODIUM CHLORIDE 9 MG/ML
40 INJECTION, SOLUTION INTRAVENOUS AS NEEDED
OUTPATIENT
Start: 2024-07-09

## 2024-07-22 PROBLEM — N87.0 DYSPLASIA OF CERVIX, LOW GRADE (CIN 1): Status: ACTIVE | Noted: 2024-07-22

## 2024-07-22 NOTE — ASSESSMENT & PLAN NOTE
Patient's colposcopy and biopsy confirmed GLENN-1.  Will repeat Pap smear with cotesting in 1 year

## 2024-07-22 NOTE — ASSESSMENT & PLAN NOTE
Patient was counseled at length regarding diagnostic laparoscopy.  We discussed the risks benefits of surgery.  Discussed that the laparoscopy could be normal and not able to identify an etiology for her pain.  All questions were answered and informed consent was obtained

## 2024-07-22 NOTE — ASSESSMENT & PLAN NOTE
Patient continues to have dysmenorrhea.  Patient was counseled at length regarding the risks benefits of diagnostic laparoscopy

## 2024-07-26 ENCOUNTER — TELEPHONE (OUTPATIENT)
Dept: OBSTETRICS AND GYNECOLOGY | Facility: CLINIC | Age: 29
End: 2024-07-26
Payer: MEDICAID

## 2024-08-19 ENCOUNTER — TELEPHONE (OUTPATIENT)
Dept: INTERNAL MEDICINE | Age: 29
End: 2024-08-19

## 2024-08-30 ENCOUNTER — TELEPHONE (OUTPATIENT)
Dept: OBSTETRICS AND GYNECOLOGY | Facility: CLINIC | Age: 29
End: 2024-08-30
Payer: MEDICAID

## 2024-08-30 RX ORDER — ALPRAZOLAM 1 MG
1 TABLET ORAL EVERY 12 HOURS SCHEDULED
COMMUNITY
Start: 2024-07-31

## 2024-08-30 NOTE — TELEPHONE ENCOUNTER
EDUARDO had sent a message that patient was on aspirin and having surgery on 9/5/24.  Dr. Iarheta was contacted and advised patient to hold aspirin 5 days prior to surgery - Patient has been advised of above.

## 2024-08-30 NOTE — PRE-PROCEDURE INSTRUCTIONS
PATIENT INSTRUCTED TO BE:    - NOTHING TO EAT AFTER MIDNIGHT OR CHEW, EXCEPT CAN HAVE CLEAR LIQUIDS 2 HOURS PRIOR TO SURGERY ARRIVAL TIME , NO MORE THAN 8 OZ. (NOTHING RED)     - TO HOLD ALL VITAMINS, SUPPLEMENTS, NSAIDS FOR ONE WEEK PRIOR TO THEIR SURGICAL PROCEDURE  - INSTRUCTED PT TO USE SURGICAL SOAP 1 TIME THE NIGHT PRIOR TO SURGERY ____9/4/24_______ OR THE AM OF SURGERY _____9/5/24________   USE THE SOAP FROM NECK TO TOES, AVOID THEIR FACE, HAIR, AND PRIVATE PARTS. IF USE THE SOAP THE NIGHT PRIOR TO SURGERY, CHANGE BED LINENS AND NO PETS IN THE BED.     INSTRUCTED NO LOTIONS, JEWELRY, PIERCINGS,  NAIL POLISH, OR DEODORANT DAY OF SURGERY  -INSTRUCTED TO TAKE THE FOLLOWING MEDICATIONS THE DAY OF SURGERY WITH SIPS OF WATER: ADDERALL, XANAX IF NEEDED.           - DO NOT BRING ANY MEDICATIONS WITH YOU TO THE HOSPITAL THE DAY OF SURGERY, EXCEPT IF USE INHALERS. BRING INHALERS DAY OF SURGERY       - DO NOT SMOKE OR VAPE 24 HOURS PRIOR TO PROCEDURE PER ANESTHESIA REQUEST     -MAKE SURE YOU HAVE A RIDE HOME OR SOMEONE TO STAY WITH YOU THE DAY OF THE PROCEDURE AFTER YOU GO HOME     - FOLLOW ANY OTHER INSTRUCTIONS GIVEN TO YOU BY YOUR SURGEON'S OFFICE.     - DAY OF SURGERY ___9/5/24_________, COME TO [facility] A, THIRD FLOOR, CHECK IN AT THE DESK FOR REGISTRATION/SURGERY     - YOU WILL RECEIVE A PHONE CALL THE DAY PRIOR TO SURGERY BETWEEN 1PM AND 4 PM WITH ARRIVAL TIME, IF YOUR SURGERY IS ON A MONDAY YOU WILL RECEIVE A CALL THE FRIDAY PRIOR TO SURGERY DATE    - BRING CASH OR CREDIT CARD FOR COPAYMENT OF MEDICATIONS AFTER SURGERY IF YOU USE THE HOSPITAL PHARMACY ([facility] TO BED)    - PREADMISSION TESTING NURSE KOSTAS COLBERT -[phone] IF HAVE ANY QUESTIONS     -PATIENT PROVIDED THE NUMBER FOR PREOP SURGICAL DEPT IF HAD QUESTIONS AFTER HOURS PRIOR TO SURGERY ([phone]).  INFORMED PT IF NO ANSWER, LEAVE A MESSAGE AND SOMEONE WILL RETURN THEIR CALL       PATIENT VERBALIZED UNDERSTANDING       Clear Liquid Diet        Find  out when you need to start a clear liquid diet.   Think of “clear liquids” as anything you could read a newspaper through. This includes things like water, broth, sports drinks, or tea WITHOUT any kind of milk or cream.           Once you are told to start a clear liquid diet, only drink these things until 2 hours before arrival to the hospital or when the hospital says to stop. Total volume limitation: 8 oz.       Clear liquids you CAN drink:   Water   Clear broth: beef, chicken, vegetable, or bone broth with nothing in it   Gatorade   Lemonade or Kadeem-aid   Soda   Tea, coffee (NO cream or honey)   Jell-O (without fruit)   Popsicles (without fruit or cream)   Italian ices   Juice without pulp: apple, white, grape   You may use salt, pepper, and sugar  NO RED  NO NOODLES    Do NOT drink:   Milk or cream   Soy milk, almond milk, coconut milk, or other non-dairy drinks and   creamers   Milkshakes or smoothies   Tomato juice   Orange juice   Grapefruit juice   Cream soups or any other than broth         Clear Liquid Diet:  Do NOT eat any solid food.  Do NOT eat or suck on mints or candy.  Do NOT chew gum.  Do NOT drink thick liquids like milk or juice with pulp in it.  Do NOT add milk, cream, or anything like soy milk or almond milk to coffee or tea.

## 2024-09-04 ENCOUNTER — ANESTHESIA EVENT (OUTPATIENT)
Dept: PERIOP | Facility: HOSPITAL | Age: 29
End: 2024-09-04
Payer: MEDICAID

## 2024-09-04 PROCEDURE — S0260 H&P FOR SURGERY: HCPCS | Performed by: OBSTETRICS & GYNECOLOGY

## 2024-09-04 NOTE — H&P
[facility]   PREOPERATIVE HISTORY AND PHYSICAL    Patient Name:Stephanie Slaughter  : 1995  MRN: [id number]  Primary Care Physician: Lakeshia Cam APRN  Date of admission: (Not on file)    Subjective   Subjective     Chief Complaint: preoperative evaluation    History of Present Illness  Stephanie Slaughter is a 29 y.o. female  who presents for preoperative evaluation. She is scheduled for DIAGNOSTIC LAPAROSCOPY (N/A)    Patient Active Problem List   Diagnosis    Attention deficit disorder (ADD) without hyperactivity    Tricuspid atresia    Insulin resistance    Dysmenorrhea    Pelvic pain    High-tone pelvic floor dysfunction    Dysplasia of cervix, low grade (GLENN 1)       Review of Systems     Personal History     Past Medical History:   Diagnosis Date    ADHD (attention deficit hyperactivity disorder)     Anxiety     Chlamydia     Depression     GERD (gastroesophageal reflux disease)     Gonorrhea     Heart abnormality     History of transfusion     Migraine     Tricuspid atresia        Past Surgical History:   Procedure Laterality Date    CARDIAC SURGERY      X4 AND PULMONARY BANDING  AS A CHILD     SECTION      EYE SURGERY      Lasik    FONTAN FENESTRATION CLOSURE      TUBAL ABDOMINAL LIGATION  2021    WISDOM TOOTH EXTRACTION         Obstetric History:  OB History          1    Para   1    Term   0       1    AB   0    Living   1         SAB   0    IAB   0    Ectopic   0    Molar   0    Multiple   0    Live Births   1               Menstrual History:     No LMP recorded. (Menstrual status: Tubal ligation).       # 1 - Date: 21, Sex: Female, Weight: 1610 g (3 lb 8.8 oz), GA: 33w6d, Type: , Low Transverse, Apgar1: 8, Apgar5: 9, Living: Living, Birth Comments: None      Family History: Her family history includes Crohn's disease in her mother.     Social History: She  reports that she has never smoked. She has never been exposed to tobacco smoke. She has never  used smokeless tobacco. She reports current alcohol use. She reports current drug use. Drug: Marijuana.    Home Medications:  ALPRAZolam, Cariprazine HCl, amphetamine-dextroamphetamine, amphetamine-dextroamphetamine XR, aspirin, dicyclomine, and mirtazapine    Allergies:  She is allergic to oseltamivir phosphate.    Objective    Objective     Vitals:         Physical Exam  Vitals and nursing note reviewed.   Constitutional:       Appearance: Normal appearance.   Cardiovascular:      Rate and Rhythm: Normal rate and regular rhythm.      Heart sounds: Normal heart sounds.   Pulmonary:      Effort: Pulmonary effort is normal.      Breath sounds: Normal breath sounds.   Abdominal:      General: Abdomen is flat. Bowel sounds are normal.      Palpations: Abdomen is soft.   Neurological:      Mental Status: She is alert.         Assessment & Plan   Assessment / Plan     Brief Patient Summary:  Stephanie Slaughter is a 29 y.o. female who presents for preoperative evaluation.    Pre-Op Diagnosis Codes:     * Pelvic pain [R10.2]     * Dysmenorrhea [N94.6]    Active Hospital Problems:  Active Hospital Problems    Diagnosis     Pelvic pain     Dysmenorrhea      Plan:   Procedure(s):  DIAGNOSTIC LAPAROSCOPY    The risks, benefits, and alternatives of the procedure are reviewed with the patient including but not limited to bleeding, infection and damage to internal organs.  Pt was counseled at length regarding the risks and benefits of surgery.  The risks include but are not limited to the risk of anesthesia, the risk of bleeding, the risk of infection, the risk of injury to the bowel, bladder, ureters and any surrounding structures.  Risks also include possibility of needing future surgery to correct an issue as a result of the first surgery.  All questions were answered and informed consent was obtained.     Questions and concerns were addressed.     Sandrine Iraheta MD

## 2024-09-05 ENCOUNTER — HOSPITAL ENCOUNTER (OUTPATIENT)
Facility: HOSPITAL | Age: 29
Setting detail: HOSPITAL OUTPATIENT SURGERY
Discharge: HOME | End: 2024-09-05
Attending: OBSTETRICS & GYNECOLOGY | Admitting: OBSTETRICS & GYNECOLOGY
Payer: MEDICAID

## 2024-09-05 ENCOUNTER — ANESTHESIA (OUTPATIENT)
Dept: PERIOP | Facility: HOSPITAL | Age: 29
End: 2024-09-05
Payer: MEDICAID

## 2024-09-05 VITALS
BODY MASS INDEX: 38.98 KG/M2 | TEMPERATURE: 97.8 F | WEIGHT: 220.02 LBS | HEART RATE: 66 BPM | RESPIRATION RATE: 18 BRPM | SYSTOLIC BLOOD PRESSURE: 101 MMHG | HEIGHT: 63 IN | OXYGEN SATURATION: 94 % | DIASTOLIC BLOOD PRESSURE: 62 MMHG

## 2024-09-05 DIAGNOSIS — N94.6 DYSMENORRHEA: ICD-10-CM

## 2024-09-05 DIAGNOSIS — R10.2 PELVIC PAIN: ICD-10-CM

## 2024-09-05 LAB
B-HCG UR QL: NEGATIVE
BASOPHILS # BLD AUTO: 0.05 10*3/MM3 (ref 0–0.2)
BASOPHILS NFR BLD AUTO: 0.9 % (ref 0–1.5)
DEPRECATED RDW RBC AUTO: 42.6 FL (ref 37–54)
EOSINOPHIL # BLD AUTO: 0.24 10*3/MM3 (ref 0–0.4)
EOSINOPHIL NFR BLD AUTO: 4.1 % (ref 0.3–6.2)
ERYTHROCYTE [DISTWIDTH] IN BLOOD BY AUTOMATED COUNT: 13 % (ref 12.3–15.4)
HCT VFR BLD AUTO: 39 % (ref 34–46.6)
HGB BLD-MCNC: 13 G/DL (ref 12–15.9)
IMM GRANULOCYTES # BLD AUTO: 0.01 10*3/MM3 (ref 0–0.05)
IMM GRANULOCYTES NFR BLD AUTO: 0.2 % (ref 0–0.5)
LYMPHOCYTES # BLD AUTO: 1.93 10*3/MM3 (ref 0.7–3.1)
LYMPHOCYTES NFR BLD AUTO: 33 % (ref 19.6–45.3)
MCH RBC QN AUTO: 30.2 PG (ref 26.6–33)
MCHC RBC AUTO-ENTMCNC: 33.3 G/DL (ref 31.5–35.7)
MCV RBC AUTO: 90.7 FL (ref 79–97)
MONOCYTES # BLD AUTO: 0.56 10*3/MM3 (ref 0.1–0.9)
MONOCYTES NFR BLD AUTO: 9.6 % (ref 5–12)
NEUTROPHILS NFR BLD AUTO: 3.06 10*3/MM3 (ref 1.7–7)
NEUTROPHILS NFR BLD AUTO: 52.2 % (ref 42.7–76)
NRBC BLD AUTO-RTO: 0 /100 WBC (ref 0–0.2)
PLATELET # BLD AUTO: 161 10*3/MM3 (ref 140–450)
PMV BLD AUTO: 11.7 FL (ref 6–12)
RBC # BLD AUTO: 4.3 10*6/MM3 (ref 3.77–5.28)
WBC NRBC COR # BLD AUTO: 5.85 10*3/MM3 (ref 3.4–10.8)

## 2024-09-05 PROCEDURE — 25010000002 PROPOFOL 10 MG/ML EMULSION

## 2024-09-05 PROCEDURE — 25010000002 MIDAZOLAM PER 1MG: Performed by: ANESTHESIOLOGY

## 2024-09-05 PROCEDURE — 25010000002 HYDROMORPHONE 1 MG/ML SOLUTION

## 2024-09-05 PROCEDURE — 25010000002 ONDANSETRON PER 1 MG: Performed by: OBSTETRICS & GYNECOLOGY

## 2024-09-05 PROCEDURE — 81025 URINE PREGNANCY TEST: CPT | Performed by: ANESTHESIOLOGY

## 2024-09-05 PROCEDURE — 25010000002 BUPIVACAINE (PF) 0.5 % SOLUTION: Performed by: OBSTETRICS & GYNECOLOGY

## 2024-09-05 PROCEDURE — 58662 LAPAROSCOPY EXCISE LESIONS: CPT | Performed by: OBSTETRICS & GYNECOLOGY

## 2024-09-05 PROCEDURE — 25010000002 DEXAMETHASONE PER 1 MG

## 2024-09-05 PROCEDURE — 25010000002 FENTANYL CITRATE (PF) 50 MCG/ML SOLUTION

## 2024-09-05 PROCEDURE — 25010000002 ONDANSETRON PER 1 MG

## 2024-09-05 PROCEDURE — 25810000003 LACTATED RINGERS PER 1000 ML: Performed by: ANESTHESIOLOGY

## 2024-09-05 PROCEDURE — 25010000002 SUGAMMADEX 200 MG/2ML SOLUTION

## 2024-09-05 PROCEDURE — 85025 COMPLETE CBC W/AUTO DIFF WBC: CPT | Performed by: OBSTETRICS & GYNECOLOGY

## 2024-09-05 RX ORDER — ONDANSETRON 2 MG/ML
INJECTION INTRAMUSCULAR; INTRAVENOUS AS NEEDED
Status: DISCONTINUED | OUTPATIENT
Start: 2024-09-05 | End: 2024-09-05 | Stop reason: SURG

## 2024-09-05 RX ORDER — MIDAZOLAM HYDROCHLORIDE 2 MG/2ML
2 INJECTION, SOLUTION INTRAMUSCULAR; INTRAVENOUS ONCE
Status: COMPLETED | OUTPATIENT
Start: 2024-09-05 | End: 2024-09-05

## 2024-09-05 RX ORDER — SODIUM CHLORIDE 9 MG/ML
40 INJECTION, SOLUTION INTRAVENOUS AS NEEDED
Status: DISCONTINUED | OUTPATIENT
Start: 2024-09-05 | End: 2024-09-05 | Stop reason: HOSPADM

## 2024-09-05 RX ORDER — ONDANSETRON 4 MG/1
4 TABLET, FILM COATED ORAL EVERY 8 HOURS PRN
Qty: 20 TABLET | Refills: 0 | Status: SHIPPED | OUTPATIENT
Start: 2024-09-05

## 2024-09-05 RX ORDER — SODIUM CHLORIDE, SODIUM LACTATE, POTASSIUM CHLORIDE, CALCIUM CHLORIDE 600; 310; 30; 20 MG/100ML; MG/100ML; MG/100ML; MG/100ML
125 INJECTION, SOLUTION INTRAVENOUS CONTINUOUS
Status: DISCONTINUED | OUTPATIENT
Start: 2024-09-05 | End: 2024-09-05 | Stop reason: HOSPADM

## 2024-09-05 RX ORDER — FENTANYL CITRATE 50 UG/ML
INJECTION, SOLUTION INTRAMUSCULAR; INTRAVENOUS AS NEEDED
Status: DISCONTINUED | OUTPATIENT
Start: 2024-09-05 | End: 2024-09-05 | Stop reason: SURG

## 2024-09-05 RX ORDER — PROMETHAZINE HYDROCHLORIDE 25 MG/1
25 SUPPOSITORY RECTAL ONCE AS NEEDED
Status: DISCONTINUED | OUTPATIENT
Start: 2024-09-05 | End: 2024-09-05 | Stop reason: HOSPADM

## 2024-09-05 RX ORDER — MEPERIDINE HYDROCHLORIDE 25 MG/ML
12.5 INJECTION INTRAMUSCULAR; INTRAVENOUS; SUBCUTANEOUS
Status: DISCONTINUED | OUTPATIENT
Start: 2024-09-05 | End: 2024-09-05 | Stop reason: HOSPADM

## 2024-09-05 RX ORDER — SODIUM CHLORIDE, SODIUM LACTATE, POTASSIUM CHLORIDE, CALCIUM CHLORIDE 600; 310; 30; 20 MG/100ML; MG/100ML; MG/100ML; MG/100ML
9 INJECTION, SOLUTION INTRAVENOUS CONTINUOUS PRN
Status: DISCONTINUED | OUTPATIENT
Start: 2024-09-05 | End: 2024-09-05 | Stop reason: HOSPADM

## 2024-09-05 RX ORDER — IBUPROFEN 600 MG/1
600 TABLET, FILM COATED ORAL EVERY 6 HOURS PRN
Qty: 40 TABLET | Refills: 0 | Status: SHIPPED | OUTPATIENT
Start: 2024-09-05

## 2024-09-05 RX ORDER — OXYCODONE HYDROCHLORIDE 5 MG/1
5 TABLET ORAL
Status: DISCONTINUED | OUTPATIENT
Start: 2024-09-05 | End: 2024-09-05 | Stop reason: HOSPADM

## 2024-09-05 RX ORDER — DEXAMETHASONE SODIUM PHOSPHATE 4 MG/ML
INJECTION, SOLUTION INTRA-ARTICULAR; INTRALESIONAL; INTRAMUSCULAR; INTRAVENOUS; SOFT TISSUE AS NEEDED
Status: DISCONTINUED | OUTPATIENT
Start: 2024-09-05 | End: 2024-09-05 | Stop reason: SURG

## 2024-09-05 RX ORDER — DEXMEDETOMIDINE HYDROCHLORIDE 100 UG/ML
INJECTION, SOLUTION INTRAVENOUS AS NEEDED
Status: DISCONTINUED | OUTPATIENT
Start: 2024-09-05 | End: 2024-09-05 | Stop reason: SURG

## 2024-09-05 RX ORDER — ACETAMINOPHEN 500 MG
1000 TABLET ORAL ONCE
Status: DISCONTINUED | OUTPATIENT
Start: 2024-09-05 | End: 2024-09-05 | Stop reason: SDUPTHER

## 2024-09-05 RX ORDER — POLYETHYLENE GLYCOL 3350 17 G/17G
17 POWDER, FOR SOLUTION ORAL DAILY
Qty: 116 G | Refills: 1 | Status: SHIPPED | OUTPATIENT
Start: 2024-09-05

## 2024-09-05 RX ORDER — SODIUM CHLORIDE 0.9 % (FLUSH) 0.9 %
3 SYRINGE (ML) INJECTION EVERY 12 HOURS SCHEDULED
Status: DISCONTINUED | OUTPATIENT
Start: 2024-09-05 | End: 2024-09-05 | Stop reason: HOSPADM

## 2024-09-05 RX ORDER — SODIUM CHLORIDE 0.9 % (FLUSH) 0.9 %
10 SYRINGE (ML) INJECTION AS NEEDED
Status: DISCONTINUED | OUTPATIENT
Start: 2024-09-05 | End: 2024-09-05 | Stop reason: HOSPADM

## 2024-09-05 RX ORDER — PROPOFOL 10 MG/ML
VIAL (ML) INTRAVENOUS AS NEEDED
Status: DISCONTINUED | OUTPATIENT
Start: 2024-09-05 | End: 2024-09-05 | Stop reason: SURG

## 2024-09-05 RX ORDER — OXYCODONE AND ACETAMINOPHEN 5; 325 MG/1; MG/1
1-2 TABLET ORAL EVERY 4 HOURS PRN
Qty: 20 TABLET | Refills: 0 | Status: SHIPPED | OUTPATIENT
Start: 2024-09-05

## 2024-09-05 RX ORDER — ONDANSETRON 2 MG/ML
4 INJECTION INTRAMUSCULAR; INTRAVENOUS ONCE AS NEEDED
Status: COMPLETED | OUTPATIENT
Start: 2024-09-05 | End: 2024-09-05

## 2024-09-05 RX ORDER — ONDANSETRON 2 MG/ML
4 INJECTION INTRAMUSCULAR; INTRAVENOUS ONCE AS NEEDED
Status: DISCONTINUED | OUTPATIENT
Start: 2024-09-05 | End: 2024-09-05 | Stop reason: SDUPTHER

## 2024-09-05 RX ORDER — PROMETHAZINE HYDROCHLORIDE 12.5 MG/1
25 TABLET ORAL ONCE AS NEEDED
Status: DISCONTINUED | OUTPATIENT
Start: 2024-09-05 | End: 2024-09-05 | Stop reason: HOSPADM

## 2024-09-05 RX ORDER — ROCURONIUM BROMIDE 10 MG/ML
INJECTION, SOLUTION INTRAVENOUS AS NEEDED
Status: DISCONTINUED | OUTPATIENT
Start: 2024-09-05 | End: 2024-09-05 | Stop reason: SURG

## 2024-09-05 RX ORDER — ACETAMINOPHEN 500 MG
1000 TABLET ORAL ONCE
Status: COMPLETED | OUTPATIENT
Start: 2024-09-05 | End: 2024-09-05

## 2024-09-05 RX ORDER — BUPIVACAINE HYDROCHLORIDE 5 MG/ML
INJECTION, SOLUTION EPIDURAL; INTRACAUDAL AS NEEDED
Status: DISCONTINUED | OUTPATIENT
Start: 2024-09-05 | End: 2024-09-05 | Stop reason: HOSPADM

## 2024-09-05 RX ORDER — LIDOCAINE HYDROCHLORIDE 20 MG/ML
INJECTION, SOLUTION EPIDURAL; INFILTRATION; INTRACAUDAL; PERINEURAL AS NEEDED
Status: DISCONTINUED | OUTPATIENT
Start: 2024-09-05 | End: 2024-09-05 | Stop reason: SURG

## 2024-09-05 RX ADMIN — PROPOFOL 150 MG: 10 INJECTION, EMULSION INTRAVENOUS at 09:25

## 2024-09-05 RX ADMIN — MIDAZOLAM HYDROCHLORIDE 2 MG: 1 INJECTION, SOLUTION INTRAMUSCULAR; INTRAVENOUS at 09:08

## 2024-09-05 RX ADMIN — ACETAMINOPHEN 1000 MG: 500 TABLET ORAL at 08:40

## 2024-09-05 RX ADMIN — SUGAMMADEX 200 MG: 100 INJECTION, SOLUTION INTRAVENOUS at 10:22

## 2024-09-05 RX ADMIN — HYDROMORPHONE HYDROCHLORIDE 0.5 MG: 1 INJECTION, SOLUTION INTRAMUSCULAR; INTRAVENOUS; SUBCUTANEOUS at 11:11

## 2024-09-05 RX ADMIN — LIDOCAINE HYDROCHLORIDE 40 MG: 20 INJECTION, SOLUTION INTRAVENOUS at 09:25

## 2024-09-05 RX ADMIN — FENTANYL CITRATE 50 MCG: 50 INJECTION, SOLUTION INTRAMUSCULAR; INTRAVENOUS at 09:46

## 2024-09-05 RX ADMIN — OXYCODONE HYDROCHLORIDE 5 MG: 5 TABLET ORAL at 11:57

## 2024-09-05 RX ADMIN — ROCURONIUM BROMIDE 70 MG: 10 INJECTION, SOLUTION INTRAVENOUS at 09:25

## 2024-09-05 RX ADMIN — ROCURONIUM BROMIDE 10 MG: 10 INJECTION, SOLUTION INTRAVENOUS at 10:04

## 2024-09-05 RX ADMIN — DEXMEDETOMIDINE HYDROCHLORIDE 8 MCG: 100 INJECTION, SOLUTION, CONCENTRATE INTRAVENOUS at 10:20

## 2024-09-05 RX ADMIN — DEXAMETHASONE SODIUM PHOSPHATE 8 MG: 4 INJECTION, SOLUTION INTRAMUSCULAR; INTRAVENOUS at 09:30

## 2024-09-05 RX ADMIN — SODIUM CHLORIDE, POTASSIUM CHLORIDE, SODIUM LACTATE AND CALCIUM CHLORIDE 9 ML/HR: 600; 310; 30; 20 INJECTION, SOLUTION INTRAVENOUS at 08:40

## 2024-09-05 RX ADMIN — FENTANYL CITRATE 50 MCG: 50 INJECTION, SOLUTION INTRAMUSCULAR; INTRAVENOUS at 09:25

## 2024-09-05 RX ADMIN — ONDANSETRON 4 MG: 2 INJECTION INTRAMUSCULAR; INTRAVENOUS at 11:54

## 2024-09-05 RX ADMIN — HYDROMORPHONE HYDROCHLORIDE 0.5 MG: 1 INJECTION, SOLUTION INTRAMUSCULAR; INTRAVENOUS; SUBCUTANEOUS at 10:58

## 2024-09-05 RX ADMIN — ONDANSETRON HYDROCHLORIDE 4 MG: 2 SOLUTION INTRAMUSCULAR; INTRAVENOUS at 09:30

## 2024-09-05 NOTE — ANESTHESIA POSTPROCEDURE EVALUATION
Patient: Stephanie Slaughter    Procedure Summary       Date: 09/05/24 Room / Location: [facility] OR 05 / [facility] MAIN OR    Anesthesia Start: 0918 Anesthesia Stop: 1035    Procedure: LAPARASCOPIC FULGERATION OF ENDOMETRIOSIS (Abdomen) Diagnosis:       Pelvic pain      Dysmenorrhea      Endometriosis of pelvic peritoneum      (Pelvic pain [R10.2])      (Dysmenorrhea [N94.6])    Surgeons: Sandrine Iraheta MD Provider: Frantz Bates MD    Anesthesia Type: general ASA Status: 2            Anesthesia Type: general    Vitals  Vitals Value Taken Time   /61 09/05/24 1044   Temp     Pulse 71 09/05/24 1047   Resp     SpO2 96 % 09/05/24 1047   Vitals shown include unfiled device data.        Post Anesthesia Care and Evaluation    Patient location during evaluation: bedside  Patient participation: complete - patient participated  Level of consciousness: awake  Pain score: 2  Pain management: adequate    Airway patency: patent  PONV Status: none  Cardiovascular status: acceptable and stable  Respiratory status: acceptable  Hydration status: acceptable

## 2024-09-05 NOTE — DISCHARGE INSTRUCTIONS
DISCHARGE INSTRUCTIONS  GYNECOLOGICAL  PROCEDURES        For your surgery you had:  General anesthesia (you may have a sore throat for the first 24 hours)  You may experience dizziness, drowsiness, or lightheadedness for several hours following surgery.  Do not stay alone today or tonight.  Limit your activity for 24 hours.  Resume your diet slowly.  Follow any special dietary instructions you may have been given by your doctor.  You should not drive or operate machinery, drink alcohol, or sign legally binding documents for 24 hours or while you are taking pain medication.     NOTIFY YOUR DOCTOR IF YOU EXPERIENCE ANY OF THE FOLLOWING:  Temperature greater than 101 degrees Fahrenheit  Shaking Chills  Redness or excessive drainage from incision  Nausea, vomiting and/or pain that is not controlled by prescribed medications  Increase in bleeding or bleeding that is excessive  Unable to urinate in 6 hours after surgery  If unable to reach your doctor, please go to the closest Emergency Room [x]  Remove dressin24     [x]  Skin adhesive will flake off in 10-14 days.     [x]  Nothing in the vagina for 2 weeks to include intercourse, douches, or tampons.  []  You may resume intercourse and the use of tampons as your physician has instructed you.     Vaginal bleeding may be expected for several days with flow decreasing with time and never any heavier than a normal  period.  If you have an ablation, vaginal discharge is expected after bleeding stops.   If you have foul smelling discharge, notify your physician.  Medications per physician instructions as indicated on After Visit Summary.     Last dose of pain medication was given at:   Tylenol 1000mg at 8:40am.  Do NOT exceed 4000mg of Tylenol in 24 hours.  Dilaudid .5mg at 10:58am and 11:11am  Zofran 4mg at 9:30am and 11:54am  Oxycodone 5mg at 11:57am                               .     SPECIAL INSTRUCTIONS:  *Follow all of Dr. Iraheta's instructions.  **Continue home  medications as directed and take prescription meds as directed.

## 2024-09-05 NOTE — OP NOTE
DIAGNOSTIC LAPAROSCOPY  Procedure Report    Patient Name:  Stephanie Slaughter  YOB: 1995    Date of Surgery:  9/5/2024         Pre-op Diagnosis:   Pelvic pain [R10.2]  Dysmenorrhea [N94.6]       Post-Op Diagnosis Codes:     * Pelvic pain [R10.2]     * Dysmenorrhea [N94.6]     * Endometriosis of pelvic peritoneum [N80.30]    Procedure/CPT® Codes:      Procedure(s):  LAPARASCOPIC FULGURATION OF ENDOMETRIOSIS    Staff:  Surgeon(s):  Sandrine Iraheta MD    Assistant: Elo Awan RN CSA    Anesthesia: General    Estimated Blood Loss: 10 mL        Specimen:          None        Findings: Normal-sized uterus freely mobile in the pelvis.  Anterior cul-de-sac with mild adhesions of the bladder to the lower uterine segment.  Bilateral fallopian tubes with surgically absent tubal segments adjacent to the fundus.  Bilateral ovaries grossly normal.  Multiple endometriosis implants bilateral uterosacral ligaments adjacent to the uterus.  Posterior cul-de-sac without any other pathology or adhesions    Complications: None    Description of Procedure:  Pt was taken to the operating room and placed under General anesthesia via LMA. She [facility] and prepped and draped in usual sterile fashion. I was called to the room following completion of draping. A surgical time out was performed. A speculum was placed in the vagina and the anterior lip of the cervix was grasped with a single tooth tenaculum.  A Hulka uterine manipulator was placed into the endometrial cavity for the purpose of uterine manipulation.  All other instruments were removed from the vagina and attention was returned to the abdomen.  The infraumbilical area was infiltrated with half percent Marcaine with epinephrine.  An incision was made with a scalpel.  A 5 mm bladeless direct view trocar was placed under direct visualization.  The pelvis was inspected with the above-noted findings.  2 more 5 millimeter  bladeless trochars were placed in a similar manner at the level of the umbilicus and in the left and right lateral positions.  The pelvis was inspected with the above-noted findings.  The endometriosis implants were fulgurated with monopolar spatula.  All instruments removed from the abdomen and the CO2 was allowed to escape from the abdomen.  The incisions were reapproximated with 4-0 Monocryl in a subcuticular fashion.  Excellent hemostasis was observed.  All instruments were removed from the vagina. All lap, sponge, and needle counts were correct x 3. The pt tolerated the procedure well and went to the recovery room in stable condition.       Assistant: Elo Awan RN CSA was responsible for performing the following activities: Retraction, Suturing, Closing, and Held/Positioned Camera and their skilled assistance was necessary for the success of this case.     Sandrine Iraheta MD     Date: 9/5/2024  Time: 10:45 EDT

## 2024-09-05 NOTE — ANESTHESIA PREPROCEDURE EVALUATION
Anesthesia Evaluation     Patient summary reviewed and Nursing notes reviewed   no history of anesthetic complications:   NPO Solid Status: > 8 hours  NPO Liquid Status: > 2 hours           Airway   Mallampati: I  TM distance: >3 FB  Neck ROM: full  No difficulty expected  Dental      Pulmonary - negative pulmonary ROS and normal exam    breath sounds clear to auscultation  Cardiovascular - negative cardio ROS and normal exam  Exercise tolerance: good (4-7 METS)    Rhythm: regular  Rate: normal        Neuro/Psych  (+) headaches, psychiatric history Anxiety and Depression  GI/Hepatic/Renal/Endo    (+) GERD    Musculoskeletal (-) negative ROS    Abdominal    Substance History - negative use     OB/GYN negative ob/gyn ROS         Other      history of cancer active    ROS/Med Hx Other: >4METS,ACTIVE.HX TRICUSPID ATRESIA,VSD,LIVER CIRRH. R/T FONTAN PROC.(SINGLE VENT.PALLIATION,PA BANDING 1996,DEBANDING & STEPHANIE SHUNT 1996,FANTAN PROC.1999.ECHO 9/23 EF 55-60%HEPATIC VEINS MILD DILAT, NOCHANGE  9/22,HOLTER 9/23,STRESS 3/23NO EKG CHANGES.KT               Latest Reference Range & Units 06/07/24 08:02   Hemoglobin 12.0 - 16.0 g/dL 13.3 (E)   Hematocrit 36.0 - 46.0 % 40.0 (E)   (E): External lab result             Anesthesia Plan    ASA 2     general     (Patient understands anesthesia not responsible for dental damage.    Sees cardiologist at [facility] yearly, will give antibiotic preop)  intravenous induction     Anesthetic plan, risks, benefits, and alternatives have been provided, discussed and informed consent has been obtained with: patient.    Use of blood products discussed with patient .    Plan discussed with CRNA.        CODE STATUS:

## 2024-09-16 ENCOUNTER — TELEPHONE (OUTPATIENT)
Dept: OBSTETRICS AND GYNECOLOGY | Facility: CLINIC | Age: 29
End: 2024-09-16
Payer: MEDICAID

## 2024-09-23 ENCOUNTER — OFFICE VISIT (OUTPATIENT)
Dept: OBSTETRICS AND GYNECOLOGY | Facility: CLINIC | Age: 29
End: 2024-09-23
Payer: MEDICAID

## 2024-09-23 VITALS — BODY MASS INDEX: 37.73 KG/M2 | SYSTOLIC BLOOD PRESSURE: 118 MMHG | WEIGHT: 213 LBS | DIASTOLIC BLOOD PRESSURE: 68 MMHG

## 2024-09-23 DIAGNOSIS — N80.30 ENDOMETRIOSIS OF PERITONEUM: Primary | ICD-10-CM

## 2024-09-23 DIAGNOSIS — N94.6 DYSMENORRHEA: ICD-10-CM

## 2024-09-23 DIAGNOSIS — R10.2 PELVIC PAIN: ICD-10-CM

## 2024-09-23 PROCEDURE — 1159F MED LIST DOCD IN RCRD: CPT | Performed by: OBSTETRICS & GYNECOLOGY

## 2024-09-23 PROCEDURE — 1160F RVW MEDS BY RX/DR IN RCRD: CPT | Performed by: OBSTETRICS & GYNECOLOGY

## 2024-09-23 PROCEDURE — 99024 POSTOP FOLLOW-UP VISIT: CPT | Performed by: OBSTETRICS & GYNECOLOGY

## 2024-10-01 ENCOUNTER — TELEPHONE (OUTPATIENT)
Dept: OBSTETRICS AND GYNECOLOGY | Facility: CLINIC | Age: 29
End: 2024-10-01
Payer: MEDICAID

## 2024-10-01 NOTE — TELEPHONE ENCOUNTER
Patient's speciality pharmacy called stating the Norethindrone on back order and unsure when it may available. They wanted to see if there was an alternate script that could be sent in. Please advise.

## 2024-10-02 DIAGNOSIS — N80.30 ENDOMETRIOSIS OF PERITONEUM: Primary | ICD-10-CM

## 2024-10-02 RX ORDER — PROGESTERONE 200 MG/1
200 CAPSULE ORAL DAILY
Qty: 90 CAPSULE | Refills: 1 | Status: SHIPPED | OUTPATIENT
Start: 2024-10-02

## 2024-10-07 ENCOUNTER — CLINICAL SUPPORT (OUTPATIENT)
Dept: OBSTETRICS AND GYNECOLOGY | Facility: CLINIC | Age: 29
End: 2024-10-07
Payer: MEDICAID

## 2024-10-07 DIAGNOSIS — N80.30 ENDOMETRIOSIS OF PERITONEUM: Primary | ICD-10-CM

## 2024-10-07 PROCEDURE — 96372 THER/PROPH/DIAG INJ SC/IM: CPT | Performed by: OBSTETRICS & GYNECOLOGY

## 2024-10-07 NOTE — PROGRESS NOTES
Patient received Depo Lupron today  Administered in Right  Ventrogluteal  Next injection due between: 3 months  Beta HCG: was not done  Urine HCG:was not done  [facility] in the last two weeks(NA if pt has been receiving depo):not applicable  Last yearly exam/Last Pap Smear: 01/24/2024  Pt. Tolerated well  Pt. Supplied Depo Lupron  No reaction noted after 15 minutes

## 2024-10-15 ENCOUNTER — TELEPHONE (OUTPATIENT)
Dept: OBSTETRICS AND GYNECOLOGY | Facility: CLINIC | Age: 29
End: 2024-10-15

## 2024-10-15 NOTE — TELEPHONE ENCOUNTER
Provider:  DR CANALES    Caller:EASTON CLEMONS     Phone Number:[phone]     Reason for Call:PATIENT IS CALLING BECAUSE SHE HAD A HORMONE INJECTION ON 10/7 AND HAS BEEN TAKING THE PROGESTERONE 200MG 1X A DAY//SHE STARTED BLEEDING YESTERDAY AND IT IS VERY HEAVY PLUS IS CRAMPING PRETTY BAD//PATIENT WASN'T SURE IF SHE NEEDS TO STOP THE MEDICATION OR WHAT SHE MAY NEED TO DO

## 2024-10-16 ENCOUNTER — TELEPHONE (OUTPATIENT)
Dept: OBSTETRICS AND GYNECOLOGY | Facility: CLINIC | Age: 29
End: 2024-10-16

## 2024-10-16 NOTE — TELEPHONE ENCOUNTER
Called patient and told her that we have sent a message to Dr. Iraheta and until we hear back from her I can not tell her to stop taking meds. I did advise her that if she is bleeding through a super pad or tampon in an hour or under  she needs to go to the ED. I told her as soon as we hear from Dr. Iraheta with recommendations we will give her a call. Patient voiced understanding.

## 2024-10-16 NOTE — TELEPHONE ENCOUNTER
Caller: EASTON CLEMONS    Relationship: SELF    Best call back number: [phone]    What is the best time to reach you: ANY    Who are you requesting to speak with (clinical staff, provider,  specific staff member): CLINICAL STAFF REGARDING 10-15-24 TE BELOW, PT WOULD LIKE A CALL BACK                   NabilajoseMaría RegSched Rep   KV    10/15/24 12:56 PM  Note      Provider:  DR CANALES     Caller:EASTON CLEMONS      Phone Number:[phone]     Reason for Call:PATIENT IS CALLING BECAUSE SHE HAD A HORMONE INJECTION ON 10/7 AND HAS BEEN TAKING THE PROGESTERONE 200MG 1X A DAY//SHE STARTED BLEEDING YESTERDAY AND IT IS VERY HEAVY PLUS IS CRAMPING PRETTY BAD//PATIENT WASN'T SURE IF SHE NEEDS TO STOP THE MEDICATION OR WHAT SHE MAY NEED TO DO

## 2024-10-17 DIAGNOSIS — N94.6 DYSMENORRHEA: Primary | ICD-10-CM

## 2024-10-17 RX ORDER — CELECOXIB 100 MG/1
100 CAPSULE ORAL 2 TIMES DAILY PRN
Qty: 20 CAPSULE | Refills: 5 | Status: SHIPPED | OUTPATIENT
Start: 2024-10-17

## 2024-10-17 NOTE — TELEPHONE ENCOUNTER
That can happen initially.  Continue the prometrium and I sent a prescription for celebrex to her pharmacy for the cramping.  She can take it two times a day when cramping.  She should not take any other pain medication with it other than plain tylenol

## 2025-01-03 ENCOUNTER — LAB (OUTPATIENT)
Dept: LAB | Facility: HOSPITAL | Age: 30
End: 2025-01-03
Payer: MEDICAID

## 2025-01-03 ENCOUNTER — TELEPHONE (OUTPATIENT)
Dept: OBSTETRICS AND GYNECOLOGY | Facility: CLINIC | Age: 30
End: 2025-01-03
Payer: MEDICAID

## 2025-01-03 ENCOUNTER — HOSPITAL ENCOUNTER (OUTPATIENT)
Dept: GENERAL RADIOLOGY | Facility: HOSPITAL | Age: 30
Discharge: HOME | End: 2025-01-03
Payer: MEDICAID

## 2025-01-03 ENCOUNTER — OFFICE VISIT (OUTPATIENT)
Dept: INTERNAL MEDICINE | Age: 30
End: 2025-01-03
Payer: MEDICAID

## 2025-01-03 VITALS
BODY MASS INDEX: 37.17 KG/M2 | TEMPERATURE: 97.5 F | HEART RATE: 102 BPM | WEIGHT: 209.8 LBS | SYSTOLIC BLOOD PRESSURE: 115 MMHG | OXYGEN SATURATION: 93 % | DIASTOLIC BLOOD PRESSURE: 80 MMHG | HEIGHT: 63 IN

## 2025-01-03 DIAGNOSIS — M25.561 ACUTE PAIN OF RIGHT KNEE: ICD-10-CM

## 2025-01-03 DIAGNOSIS — Z00.00 ANNUAL PHYSICAL EXAM: ICD-10-CM

## 2025-01-03 DIAGNOSIS — E55.9 VITAMIN D DEFICIENCY: ICD-10-CM

## 2025-01-03 DIAGNOSIS — M54.41 CHRONIC MIDLINE LOW BACK PAIN WITH BILATERAL SCIATICA: Primary | ICD-10-CM

## 2025-01-03 DIAGNOSIS — M54.42 CHRONIC MIDLINE LOW BACK PAIN WITH BILATERAL SCIATICA: ICD-10-CM

## 2025-01-03 DIAGNOSIS — Z11.59 NEED FOR HEPATITIS C SCREENING TEST: ICD-10-CM

## 2025-01-03 DIAGNOSIS — G89.29 CHRONIC MIDLINE LOW BACK PAIN WITH BILATERAL SCIATICA: ICD-10-CM

## 2025-01-03 DIAGNOSIS — N80.30 ENDOMETRIOSIS OF PERITONEUM: ICD-10-CM

## 2025-01-03 DIAGNOSIS — G89.29 CHRONIC MIDLINE LOW BACK PAIN WITH BILATERAL SCIATICA: Primary | ICD-10-CM

## 2025-01-03 DIAGNOSIS — M54.41 CHRONIC MIDLINE LOW BACK PAIN WITH BILATERAL SCIATICA: ICD-10-CM

## 2025-01-03 DIAGNOSIS — M54.42 CHRONIC MIDLINE LOW BACK PAIN WITH BILATERAL SCIATICA: Primary | ICD-10-CM

## 2025-01-03 LAB
25(OH)D3 SERPL-MCNC: 25 NG/ML (ref 30–100)
ALBUMIN SERPL-MCNC: 4.7 G/DL (ref 3.5–5.2)
ALBUMIN/GLOB SERPL: 1.6 G/DL
ALP SERPL-CCNC: 85 U/L (ref 39–117)
ALT SERPL W P-5'-P-CCNC: 29 U/L (ref 1–33)
ANION GAP SERPL CALCULATED.3IONS-SCNC: 9.8 MMOL/L (ref 5–15)
AST SERPL-CCNC: 25 U/L (ref 1–32)
BASOPHILS # BLD AUTO: 0.07 10*3/MM3 (ref 0–0.2)
BASOPHILS NFR BLD AUTO: 1.1 % (ref 0–1.5)
BILIRUB BLD-MCNC: NEGATIVE MG/DL
BILIRUB SERPL-MCNC: 0.9 MG/DL (ref 0–1.2)
BUN SERPL-MCNC: 16 MG/DL (ref 6–20)
BUN/CREAT SERPL: 19.8 (ref 7–25)
CALCIUM SPEC-SCNC: 10 MG/DL (ref 8.6–10.5)
CHLORIDE SERPL-SCNC: 104 MMOL/L (ref 98–107)
CHOLEST SERPL-MCNC: 156 MG/DL (ref 0–200)
CLARITY, POC: CLEAR
CO2 SERPL-SCNC: 26.2 MMOL/L (ref 22–29)
COLOR UR: YELLOW
CREAT SERPL-MCNC: 0.81 MG/DL (ref 0.57–1)
DEPRECATED RDW RBC AUTO: 41.4 FL (ref 37–54)
EGFRCR SERPLBLD CKD-EPI 2021: 101.5 ML/MIN/1.73
EOSINOPHIL # BLD AUTO: 0.25 10*3/MM3 (ref 0–0.4)
EOSINOPHIL NFR BLD AUTO: 3.9 % (ref 0.3–6.2)
ERYTHROCYTE [DISTWIDTH] IN BLOOD BY AUTOMATED COUNT: 13 % (ref 12.3–15.4)
EXPIRATION DATE: NORMAL
GLOBULIN UR ELPH-MCNC: 2.9 GM/DL
GLUCOSE SERPL-MCNC: 81 MG/DL (ref 65–99)
GLUCOSE UR STRIP-MCNC: NEGATIVE MG/DL
HCT VFR BLD AUTO: 42.6 % (ref 34–46.6)
HCV AB SER QL: NORMAL
HDLC SERPL-MCNC: 45 MG/DL (ref 40–60)
HGB BLD-MCNC: 14.3 G/DL (ref 12–15.9)
IMM GRANULOCYTES # BLD AUTO: 0.02 10*3/MM3 (ref 0–0.05)
IMM GRANULOCYTES NFR BLD AUTO: 0.3 % (ref 0–0.5)
KETONES UR QL: NEGATIVE
LDLC SERPL CALC-MCNC: 86 MG/DL (ref 0–100)
LDLC/HDLC SERPL: 1.84 {RATIO}
LEUKOCYTE EST, POC: NEGATIVE
LYMPHOCYTES # BLD AUTO: 1.77 10*3/MM3 (ref 0.7–3.1)
LYMPHOCYTES NFR BLD AUTO: 27.3 % (ref 19.6–45.3)
Lab: NORMAL
MCH RBC QN AUTO: 29.5 PG (ref 26.6–33)
MCHC RBC AUTO-ENTMCNC: 33.6 G/DL (ref 31.5–35.7)
MCV RBC AUTO: 88 FL (ref 79–97)
MONOCYTES # BLD AUTO: 0.56 10*3/MM3 (ref 0.1–0.9)
MONOCYTES NFR BLD AUTO: 8.6 % (ref 5–12)
NEUTROPHILS NFR BLD AUTO: 3.82 10*3/MM3 (ref 1.7–7)
NEUTROPHILS NFR BLD AUTO: 58.8 % (ref 42.7–76)
NITRITE UR-MCNC: NEGATIVE MG/ML
NRBC BLD AUTO-RTO: 0 /100 WBC (ref 0–0.2)
PH UR: 6 [PH] (ref 5–8)
PLATELET # BLD AUTO: 196 10*3/MM3 (ref 140–450)
PMV BLD AUTO: 12.1 FL (ref 6–12)
POTASSIUM SERPL-SCNC: 4.4 MMOL/L (ref 3.5–5.2)
PROT SERPL-MCNC: 7.6 G/DL (ref 6–8.5)
PROT UR STRIP-MCNC: NEGATIVE MG/DL
RBC # BLD AUTO: 4.84 10*6/MM3 (ref 3.77–5.28)
RBC # UR STRIP: NEGATIVE /UL
SODIUM SERPL-SCNC: 140 MMOL/L (ref 136–145)
SP GR UR: 1.03 (ref 1–1.03)
TRIGL SERPL-MCNC: 141 MG/DL (ref 0–150)
TSH SERPL DL<=0.05 MIU/L-ACNC: 2.92 UIU/ML (ref 0.27–4.2)
UROBILINOGEN UR QL: NORMAL
VLDLC SERPL-MCNC: 25 MG/DL (ref 5–40)
WBC NRBC COR # BLD AUTO: 6.49 10*3/MM3 (ref 3.4–10.8)

## 2025-01-03 PROCEDURE — 80061 LIPID PANEL: CPT

## 2025-01-03 PROCEDURE — 85025 COMPLETE CBC W/AUTO DIFF WBC: CPT

## 2025-01-03 PROCEDURE — 86803 HEPATITIS C AB TEST: CPT

## 2025-01-03 PROCEDURE — 82306 VITAMIN D 25 HYDROXY: CPT

## 2025-01-03 PROCEDURE — 73562 X-RAY EXAM OF KNEE 3: CPT

## 2025-01-03 PROCEDURE — 84443 ASSAY THYROID STIM HORMONE: CPT

## 2025-01-03 PROCEDURE — 36415 COLL VENOUS BLD VENIPUNCTURE: CPT

## 2025-01-03 PROCEDURE — 80053 COMPREHEN METABOLIC PANEL: CPT

## 2025-01-03 PROCEDURE — 72110 X-RAY EXAM L-2 SPINE 4/>VWS: CPT

## 2025-01-03 RX ORDER — CYCLOBENZAPRINE HCL 10 MG
10 TABLET ORAL 3 TIMES DAILY PRN
Qty: 21 TABLET | Refills: 1 | Status: SHIPPED | OUTPATIENT
Start: 2025-01-03

## 2025-01-03 RX ORDER — IBUPROFEN 600 MG/1
600 TABLET, FILM COATED ORAL 3 TIMES DAILY PRN
Qty: 30 TABLET | Refills: 1 | Status: SHIPPED | OUTPATIENT
Start: 2025-01-03

## 2025-01-03 NOTE — TELEPHONE ENCOUNTER
Provider:  DR CANALES     Caller: EASTON CLEMONS    Phone Number: [phone]     Reason for Call:PATIENT WAS CALLING TO SCHEDULE WITH PROVIDER BUT THE [facility]ONE[facility] IS THE [facility]//SHE DOESN'T WANT TO CONTINUE THE HORMONE INJECTIONS BUT FEELS LIKE SHE NEEDS TO BE SOMETHING ELSE BECAUSE THE PAIN AND CRAMPING ARE BACK AND SHE STATED THAT THEY ARE SEVERE SHE WOULD LIKE SOMEONE TO FOLLOW UP BECAUSE SHE DOESN'T THINK SHE CAN WAIT UNTIL MARCH

## 2025-01-03 NOTE — PROGRESS NOTES
Chief Complaint  Pain (The patient is coming in complaining of pain in her lower back and goes down into her legs. She states its every day. Pain level is currently a 7. She says standing too long hurts. This has never happened to her before. )    Subjective      History of Present Illness  The patient is a 29-year-old female who presents for an acute visit due to back pain.    She reports experiencing severe lower back pain, described as spasmodic in nature, which radiates to her legs and knees. The pain has been persistent for the past few weeks, with an onset approximately 1.5 months ago. She describes the pain as excruciating, often necessitating her to lie on her side with her leg elevated on a pillow for comfort. Prolonged standing exacerbates her back pain, compelling her to sit down. She also experiences morning stiffness. She has not undergone any radiographic imaging for her back. She has previously tried naproxen for cramping, but it did not provide relief. She reports no urinary difficulties. She has a history of lower back issues since the birth of her daughter, which have progressively worsened over the past year, particularly in the last 6 to 8 weeks. She has not sustained any back injuries. She has previously taken Flexeril, which provided some relief. She plans to resume gym workouts as she believes her weight gain may be contributing to her back pain. Prolonged standing exacerbates her back pain, compelling her to sit down. She has previously taken Flexeril, which provided some relief.    She was diagnosed with endometriosis and underwent a 3-month course of hormone injections, which she discontinued due to adverse effects. The cessation of the hormone injections has led to a resurgence of her symptoms. She is under the care of Dr. Iraheta, a gynecologist, who is unable to see her until 03/24/2024. She received a Depo-Lupron injection on 09/23/2023, which alleviated her cramping but exacerbated other  symptoms such as hot and cold flashes, menopausal symptoms, and back pain. She has not found Celebrex to be effective for her endometriosis and has not tried it for her back pain. She has previously undergone physical therapy at [facility], which included pelvic floor exercises. She received a Depo-Lupron injection on 2023, which alleviated her cramping but exacerbated other symptoms such as hot and cold flashes, menopausal symptoms, and back pain. She has not found Celebrex to be effective for her endometriosis and has not tried it for her back pain. She has previously undergone physical therapy at [facility], which included pelvic floor exercises.    She experienced a fall on New Year's Radha, resulting in right knee pain. She reports no swelling in the knee but notes a bala from the fall. The pain is absent during ambulation but becomes noticeable when sitting. She has not had any x-rays of her knee.    She experienced frequent urination and mild pain approximately 6 weeks ago, which she attributes to a urinary tract infection (UTI) that has since resolved. She has a history of low vitamin D levels and has taken supplements in the past. She reports no history of diabetes.    MEDICATIONS  Current: Flexeril, ibuprofen       Past Medical History:   Diagnosis Date    ADHD (attention deficit hyperactivity disorder)     Anxiety     Chlamydia     Depression     GERD (gastroesophageal reflux disease)     Gonorrhea     Heart abnormality     History of transfusion     Migraine     Tricuspid atresia         Past Surgical History:   Procedure Laterality Date    CARDIAC SURGERY      X4 AND PULMONARY BANDING  AS A CHILD     SECTION      DIAGNOSTIC LAPAROSCOPY N/A 2024    Procedure: LAPARASCOPIC FULGERATION OF ENDOMETRIOSIS;  Surgeon: Sandrine Iraheta MD;  Location: [facility];  Service: Gynecology;  Laterality: N/A;    EYE SURGERY      Lasik    FONTAN FENESTRATION CLOSURE      TUBAL ABDOMINAL LIGATION  2021    [place]  "TOOTH EXTRACTION          Social History     Tobacco Use   Smoking Status Never    Passive exposure: Never   Smokeless Tobacco Never        Patient Care Team:  Lakeshia Cam APRN as PCP - General (Nurse Practitioner)  Sandrine Iraheta MD as Consulting Physician (Obstetrics and Gynecology)    Allergies   Allergen Reactions    Oseltamivir Phosphate Other (See Comments)     \"tamiflu\"  hallucinations          Current Outpatient Medications:     Adderall XR 30 MG 24 hr capsule, Take 1 capsule by mouth Daily, Disp: , Rfl:     ALPRAZolam (XANAX) 1 MG tablet, Take 1 tablet by mouth Every 12 (Twelve) Hours., Disp: , Rfl:     amphetamine-dextroamphetamine (ADDERALL) 20 MG tablet, TAKE 1 TABLET BY MOUTH EVERY AFTERNOON, Disp: , Rfl:     aspirin 81 MG chewable tablet, Chew 1 tablet Daily., Disp: , Rfl:     dicyclomine (BENTYL) 10 MG capsule, Take 1 capsule by mouth As Needed., Disp: , Rfl:     mirtazapine (REMERON) 7.5 MG tablet, TAKE 1-2 TABLET BY MOUTH AT BEDTIME, Disp: , Rfl:     naloxone (NARCAN) 4 MG/0.1ML nasal spray, Call 911. Don't prime. [place] in 1 nostril for overdose. Repeat in 2-3 minutes in other nostril if no or minimal breathing/responsiveness., Disp: 2 each, Rfl: 0    ondansetron (Zofran) 4 MG tablet, Take 1 tablet by mouth Every 8 (Eight) Hours As Needed for Nausea or Vomiting., Disp: 20 tablet, Rfl: 0    polyethylene glycol (MIRALAX) 17 GM/SCOOP powder, Take 17 g by mouth Daily., Disp: 116 g, Rfl: 1    Progesterone (Prometrium) 200 MG capsule, Take 1 capsule by mouth Daily., Disp: 90 capsule, Rfl: 1    Vraylar 1.5 MG capsule capsule, Take 1 capsule by mouth Daily., Disp: , Rfl:     cyclobenzaprine (FLEXERIL) 10 MG tablet, Take 1 tablet by mouth 3 (Three) Times a Day As Needed for Muscle Spasms., Disp: 21 tablet, Rfl: 1    ibuprofen (ADVIL,MOTRIN) 600 MG tablet, Take 1 tablet by mouth 3 (Three) Times a Day As Needed for Mild Pain or Moderate Pain., Disp: 30 tablet, Rfl: 1    Objective     Vitals:    " "01/03/25 1153   BP: 115/80   BP Location: Left arm   Patient Position: Sitting   Cuff Size: Large Adult   Pulse: 102   Temp: 97.5 °F (36.4 °C)   TempSrc: Temporal   SpO2: 93%   Weight: 95.2 kg (209 lb 12.8 oz)   Height: 160 cm (63\")        Wt Readings from Last 3 Encounters:   01/03/25 95.2 kg (209 lb 12.8 oz)   09/23/24 96.6 kg (213 lb)   09/05/24 99.8 kg (220 lb 0.3 oz)        BP Readings from Last 3 Encounters:   01/03/25 115/80   09/23/24 118/68   09/05/24 101/62          Physical Exam  Vitals reviewed.   Constitutional:       General: She is not in acute distress.  HENT:      Head: Normocephalic and atraumatic.   Pulmonary:      Effort: Pulmonary effort is normal.   Abdominal:      General: There is no distension.      Palpations: Abdomen is soft. There is no mass.      Tenderness: There is abdominal tenderness in the right lower quadrant, suprapubic area and left lower quadrant. There is no right CVA tenderness or left CVA tenderness.   Musculoskeletal:      Lumbar back: Tenderness and bony tenderness present. Negative right straight leg raise test and negative left straight leg raise test.      Right knee: Ecchymosis and bony tenderness present. Tenderness present over the MCL.   Skin:     General: Skin is warm and dry.   Neurological:      General: No focal deficit present.      Mental Status: She is alert.      Motor: No weakness.   Psychiatric:         Mood and Affect: Mood normal.         Thought Content: Thought content normal.                Result Review   The following data was reviewed by: LELE Moreira on 01/03/2025:  [x]  Tests & Results  []  Hospitalization/Emergency Department/Urgent Care  [x]  Internal/External Consultant Notes       Assessment and Plan     Diagnoses and all orders for this visit:    1. Chronic midline low back pain with bilateral sciatica (Primary)  -     XR Spine Lumbar Complete 4+VW; Future  -     POC Urinalysis Dipstick, Automated  -     Ambulatory Referral to " Physical Therapy for Evaluation & Treatment    2. Acute pain of right knee  -     XR Knee 3 View Right; Future  -     Ambulatory Referral to Physical Therapy for Evaluation & Treatment    3. Endometriosis of peritoneum    4. Vitamin D deficiency  -     Vitamin D,25-Hydroxy; Future    5. Need for hepatitis C screening test  -     Hepatitis C Antibody; Future    6. Annual physical exam  -     CBC & Differential; Future  -     Comprehensive Metabolic Panel; Future  -     Lipid Panel; Future  -     TSH Rfx On Abnormal To Free T4; Future    Other orders  -     cyclobenzaprine (FLEXERIL) 10 MG tablet; Take 1 tablet by mouth 3 (Three) Times a Day As Needed for Muscle Spasms.  Dispense: 21 tablet; Refill: 1  -     ibuprofen (ADVIL,MOTRIN) 600 MG tablet; Take 1 tablet by mouth 3 (Three) Times a Day As Needed for Mild Pain or Moderate Pain.  Dispense: 30 tablet; Refill: 1             Assessment & Plan  1. Lower back pain.  The etiology of the back pain remains uncertain, but it is unlikely to be a side effect of the Depo-Lupron injection. The pain could potentially be attributed to endometriosis, although this is not definitive. An x-ray of the back will be ordered. A urine test will be conducted to rule out a urinary tract infection. A referral for physical therapy at [facility] has been made. Prescriptions for Flexeril 10 mg and ibuprofen 600 mg have been provided, both to be taken three times daily as needed for pain, and should be consumed with food. She has been advised to perform back stretches before getting out of bed in the morning. If there is no improvement in her back pain following physical therapy, further diagnostic tests will be considered.    2. Right knee pain.  The knee pain is likely due to inflammation from a recent fall. An x-ray of the right knee will be ordered. A referral for physical therapy at [facility] has been made. She has been advised to apply ice to the knee and consider using a knee brace. If the knee pain  persists over the next few weeks and the x-ray results are normal, a referral to an orthopedic specialist will be considered.    3. Endometriosis.  The patient reports that the Depo-Lupron injection helped with cramping but caused significant side effects, including hot flashes and mental health issues. She has decided not to continue with the hormone injection. She will follow up with her gynecologist, Dr. Iraheta, on March 24.    4. Health maintenance.  A urine test will be conducted to rule out a urinary tract infection. Fasting labs will be ordered to check cholesterol and vitamin D levels. An influenza vaccine will be administered during her next visit if available.    Follow-up  The patient will follow up in 2 weeks.    Patient was given instructions and counseling regarding her condition or for health maintenance advice. Please see specific information pulled into the AVS if appropriate.     Follow Up   Return in about 2 weeks (around 1/17/2025) for Annual physical.    Patient or patient representative verbalized consent for the use of Ambient Listening during the visit with  LELE Moreira for chart documentation. 1/3/2025  12:06 LELE Matos

## 2025-02-03 ENCOUNTER — OFFICE VISIT (OUTPATIENT)
Dept: INTERNAL MEDICINE | Age: 30
End: 2025-02-03
Payer: MEDICAID

## 2025-02-03 VITALS
HEIGHT: 63 IN | DIASTOLIC BLOOD PRESSURE: 82 MMHG | HEART RATE: 103 BPM | BODY MASS INDEX: 36.82 KG/M2 | SYSTOLIC BLOOD PRESSURE: 120 MMHG | WEIGHT: 207.8 LBS | OXYGEN SATURATION: 92 % | TEMPERATURE: 98.4 F | RESPIRATION RATE: 16 BRPM

## 2025-02-03 DIAGNOSIS — Z00.00 ANNUAL PHYSICAL EXAM: Primary | ICD-10-CM

## 2025-02-03 DIAGNOSIS — M54.42 CHRONIC MIDLINE LOW BACK PAIN WITH BILATERAL SCIATICA: ICD-10-CM

## 2025-02-03 DIAGNOSIS — J06.9 ACUTE UPPER RESPIRATORY INFECTION: ICD-10-CM

## 2025-02-03 DIAGNOSIS — G89.29 CHRONIC MIDLINE LOW BACK PAIN WITH BILATERAL SCIATICA: ICD-10-CM

## 2025-02-03 DIAGNOSIS — M25.561 ACUTE PAIN OF RIGHT KNEE: ICD-10-CM

## 2025-02-03 DIAGNOSIS — M54.41 CHRONIC MIDLINE LOW BACK PAIN WITH BILATERAL SCIATICA: ICD-10-CM

## 2025-02-03 DIAGNOSIS — J02.9 SORE THROAT: ICD-10-CM

## 2025-02-03 DIAGNOSIS — E55.9 VITAMIN D DEFICIENCY: ICD-10-CM

## 2025-02-03 PROCEDURE — 1159F MED LIST DOCD IN RCRD: CPT | Performed by: NURSE PRACTITIONER

## 2025-02-03 PROCEDURE — 1125F AMNT PAIN NOTED PAIN PRSNT: CPT | Performed by: NURSE PRACTITIONER

## 2025-02-03 PROCEDURE — 87428 SARSCOV & INF VIR A&B AG IA: CPT | Performed by: NURSE PRACTITIONER

## 2025-02-03 PROCEDURE — 1160F RVW MEDS BY RX/DR IN RCRD: CPT | Performed by: NURSE PRACTITIONER

## 2025-02-03 PROCEDURE — 2014F MENTAL STATUS ASSESS: CPT | Performed by: NURSE PRACTITIONER

## 2025-02-03 PROCEDURE — 87880 STREP A ASSAY W/OPTIC: CPT | Performed by: NURSE PRACTITIONER

## 2025-02-03 PROCEDURE — 99395 PREV VISIT EST AGE 18-39: CPT | Performed by: NURSE PRACTITIONER

## 2025-02-03 RX ORDER — CYCLOBENZAPRINE HCL 10 MG
10 TABLET ORAL 3 TIMES DAILY PRN
Qty: 21 TABLET | Refills: 5 | Status: SHIPPED | OUTPATIENT
Start: 2025-02-03

## 2025-02-03 NOTE — PROGRESS NOTES
Chief Complaint  Back Pain (Some better but still having a lot of muscle spasms. Asking for a refill on her muscle relaxer. ) and Cough (Mild cough, light yellow nose drainage, sore throat, headache, no body aches, no fever or chills, no loss of taste or smell, little diarrhea yesterday morning with nausea and vomiting. )    Subjective      History of Present Illness  The patient is a 29-year-old female who presents for an annual wellness exam, acute upper respiratory symptoms, and follow-up for back pain and knee pain.    She has been experiencing symptoms of an upper respiratory infection since the previous morning, characterized by productive cough, sore throat, nasal congestion, and headache. She reports no myalgia, fever, chills, or loss of taste or smell. She also experienced diarrhea, nausea, and vomiting yesterday, which she attributes to alcohol consumption the night before. She does not report any shortness of breath or dysphagia.    She has initiated physical therapy for her back condition, which she reports as beneficial. Despite ongoing back spasms, she finds relief from the stretching exercises and other therapeutic interventions. She is currently on an as-needed regimen of muscle relaxants, which she reports as effective.    Her knee pain has shown improvement, which she believes was due to a fall and required time to heal.    She has been diagnosed with vitamin D deficiency and is currently on a daily dose of 10,000 units of vitamin D.    Supplemental Information  She follows up with a liver doctor due to concerns about her liver related to the Fontan procedure she had for her heart.    SOCIAL HISTORY  She admits to drinking alcohol.    FAMILY HISTORY  She has a family history of high cholesterol.     Past Medical History:   Diagnosis Date    ADHD (attention deficit hyperactivity disorder)     Anxiety     Chlamydia     Depression     GERD (gastroesophageal reflux disease)     Gonorrhea     Heart  "abnormality     History of transfusion     Migraine     Tricuspid atresia         Past Surgical History:   Procedure Laterality Date    CARDIAC SURGERY      X4 AND PULMONARY BANDING  AS A CHILD     SECTION      DIAGNOSTIC LAPAROSCOPY N/A 2024    Procedure: LAPARASCOPIC FULGERATION OF ENDOMETRIOSIS;  Surgeon: Sandrine Iraheta MD;  Location: [facility] MAIN OR;  Service: Gynecology;  Laterality: N/A;    EYE SURGERY      Lasik    FONTAN FENESTRATION CLOSURE      TUBAL ABDOMINAL LIGATION  2021    WISDOM TOOTH EXTRACTION          Social History     Tobacco Use   Smoking Status Never    Passive exposure: Never   Smokeless Tobacco Never        Patient Care Team:  Lakeshia Cam APRN as PCP - General (Nurse Practitioner)  Sandrine Iraheta MD as Consulting Physician (Obstetrics and Gynecology)    Allergies   Allergen Reactions    Oseltamivir Phosphate Other (See Comments)     \"tamiflu\"  hallucinations          Current Outpatient Medications:     Adderall XR 30 MG 24 hr capsule, Take 1 capsule by mouth Daily, Disp: , Rfl:     ALPRAZolam (XANAX) 1 MG tablet, Take 1 tablet by mouth Every 12 (Twelve) Hours., Disp: , Rfl:     amphetamine-dextroamphetamine (ADDERALL) 20 MG tablet, TAKE 1 TABLET BY MOUTH EVERY AFTERNOON, Disp: , Rfl:     aspirin 81 MG chewable tablet, Chew 1 tablet Daily., Disp: , Rfl:     cholecalciferol (VITAMIN D3) 250 MCG (04851 UT) capsule, Take 1 capsule by mouth Daily., Disp: 90 capsule, Rfl: 3    cyclobenzaprine (FLEXERIL) 10 MG tablet, Take 1 tablet by mouth 3 (Three) Times a Day As Needed for Muscle Spasms., Disp: 21 tablet, Rfl: 5    dicyclomine (BENTYL) 10 MG capsule, Take 1 capsule by mouth As Needed., Disp: , Rfl:     ibuprofen (ADVIL,MOTRIN) 600 MG tablet, Take 1 tablet by mouth 3 (Three) Times a Day As Needed for Mild Pain or Moderate Pain., Disp: 30 tablet, Rfl: 1    mirtazapine (REMERON) 7.5 MG tablet, TAKE 1-2 TABLET BY MOUTH AT BEDTIME, Disp: , Rfl:     naloxone (NARCAN) 4 MG/0.1ML " "nasal spray, Call 911. Don't prime. [place] in 1 nostril for overdose. Repeat in 2-3 minutes in other nostril if no or minimal breathing/responsiveness., Disp: 2 each, Rfl: 0    ondansetron (Zofran) 4 MG tablet, Take 1 tablet by mouth Every 8 (Eight) Hours As Needed for Nausea or Vomiting., Disp: 20 tablet, Rfl: 0    polyethylene glycol (MIRALAX) 17 GM/SCOOP powder, Take 17 g by mouth Daily., Disp: 116 g, Rfl: 1    Vraylar 1.5 MG capsule capsule, Take 1 capsule by mouth Daily., Disp: , Rfl:     Objective     Vitals:    02/03/25 1511   BP: 120/82   BP Location: Right arm   Patient Position: Sitting   Cuff Size: Large Adult   Pulse: 103   Resp: 16   Temp: 98.4 °F (36.9 °C)   TempSrc: Temporal   SpO2: 92%   Weight: 94.3 kg (207 lb 12.8 oz)   Height: 160 cm (63\")        Wt Readings from Last 3 Encounters:   02/03/25 94.3 kg (207 lb 12.8 oz)   01/03/25 95.2 kg (209 lb 12.8 oz)   09/23/24 96.6 kg (213 lb)        BP Readings from Last 3 Encounters:   02/03/25 120/82   01/03/25 115/80   09/23/24 118/68      Physical Exam  Vitals reviewed.   Constitutional:       General: She is not in acute distress.  HENT:      Head: Normocephalic and atraumatic.      Mouth/Throat:      Mouth: Mucous membranes are moist.      Pharynx: Oropharynx is clear. No posterior oropharyngeal erythema.   Eyes:      Conjunctiva/sclera: Conjunctivae normal.   Cardiovascular:      Rate and Rhythm: Normal rate and regular rhythm.      Heart sounds: Normal heart sounds.   Pulmonary:      Effort: Pulmonary effort is normal.      Breath sounds: Normal breath sounds. No wheezing, rhonchi or rales.   Abdominal:      General: There is no distension.      Palpations: Abdomen is soft. There is no mass.      Tenderness: There is no abdominal tenderness.   Musculoskeletal:      Right lower leg: No edema.      Left lower leg: No edema.   Lymphadenopathy:      Cervical: No cervical adenopathy.   Skin:     General: Skin is warm and dry.   Neurological:      General: No " focal deficit present.      Mental Status: She is alert.   Psychiatric:         Mood and Affect: Mood normal.         Thought Content: Thought content normal.                Result Review   The following data was reviewed by: LELE Moreira on 02/03/2025:  [x]  Tests & Results  []  Hospitalization/Emergency Department/Urgent Care  []  Internal/External Consultant Notes       Assessment and Plan     Diagnoses and all orders for this visit:    1. Annual physical exam (Primary)    2. Acute upper respiratory infection    3. Chronic midline low back pain with bilateral sciatica    4. Acute pain of right knee    5. Vitamin D deficiency  -     Vitamin D,25-Hydroxy; Future    6. Sore throat  -     POCT SARS-CoV-2 Antigen VINAYAK + Flu  -     POCT rapid strep A    Other orders  -     cyclobenzaprine (FLEXERIL) 10 MG tablet; Take 1 tablet by mouth 3 (Three) Times a Day As Needed for Muscle Spasms.  Dispense: 21 tablet; Refill: 5         Assessment & Plan  1. Annual wellness examination.  Her laboratory results are within normal limits, except for a low vitamin D level of 25. Hepatitis C antibody test was normal. Thyroid function was normal. Cholesterol levels were normal. Comprehensive panel (liver and kidney function) was normal. She is advised to receive the influenza and COVID-19 vaccines at her local pharmacy.    2. Acute upper respiratory symptoms.  She reports a sore throat, congestion, headache, and cough with sputum production. Tests for COVID-19, influenza, and strep were negative. Symptomatic treatment is recommended, including rest, hydration, and over-the-counter medications as needed.    3. Back pain.  She is currently undergoing physical therapy, which has been helpful. She reports continued back spasms but finds relief with stretches and exercises. A prescription for muscle relaxers with several refills has been provided to [facility], to be used as needed.    4. Knee pain.  Her knee pain has improved and is likely  due to soreness from a fall. X-rays were previously done and showed no significant issues.    5. Vitamin D deficiency.  Her vitamin D level is 25, which is below the normal range. She is advised to continue taking vitamin D 10,000 units daily with food for better absorption. A recheck of her vitamin D level is scheduled in 4 months.    Follow-up  The patient will follow up after completing her physical therapy.     Patient was given instructions and counseling regarding her condition or for health maintenance advice. Please see specific information pulled into the AVS if appropriate.     Follow Up   Return follow up post physical therapy is complete..    Patient or patient representative verbalized consent for the use of Ambient Listening during the visit with  LELE Moreira for chart documentation. 2/3/2025  15:23 LELE Matos

## 2025-02-05 NOTE — PROGRESS NOTES
GYN Visit    CC: Endometriosis    HPI:   29 y.o. Contraception or HRT: Tubal ligation    History of Present Illness  The patient presents for evaluation of endometriosis.    She has a history of receiving a single Depo-Lupron injection, which she reports as being effective in alleviating her pelvic pain. However, she continues to experience cramping. The treatment was discontinued due to the onset of menopausal symptoms, including hot flashes, night sweats, and mood swings, which she found intolerable. She also reports occasional hot flashes. She has been experiencing severe back pain, for which she has been prescribed medication that provides relief. She has previously used Celebrex for lower back pain, which she found beneficial. She has a supply of this medication at home. She has a history of using Mirena IUD for 8 years, which was initially effective until it became embedded, leading to discontinuation of birth control. She expresses concern about potential weight gain associated with birth control pills, as she is currently engaged in a weight loss regimen that includes regular exercise. She also reports memory issues and pre-existing heart conditions.    MEDICATIONS  Current: Celebrex     History: PMHx, Meds, Allergies, PSHx, Social Hx, and POBHx all reviewed and updated.    PHYSICAL EXAM:  /80   Pulse 105   Wt 93.4 kg (206 lb)   LMP 10/15/2024 (Approximate)   Breastfeeding No   BMI 36.49 kg/m²   General- NAD, alert and oriented, appropriate  Psych- Normal mood, good memory      ASSESSMENT AND PLAN:  Diagnoses and all orders for this visit:    1. Endometriosis of peritoneum (Primary)  -     Elagolix Sodium (Orilissa) 150 MG tablet; Take 150 mg by mouth Daily.  Dispense: 30 tablet; Refill: 11    2. High-tone pelvic floor dysfunction        Assessment & Plan  1. Endometriosis.  The patient experienced some relief from pelvic pain with Depo-Lupron but discontinued it due to significant side  effects such as hot flashes, night sweats, and mood swings. The endometriosis was primarily located on the uterosacral ligaments and had one or two spots on the bladder reflection. There was no endometriosis on the sigmoid colon or rectum. The right ovary appeared normal, while there was a small spot of endometriosis on the left ovary. Orilissa will be prescribed for daily use to manage the endometriosis, with a specific dose for patients who do not experience dyspareunia. The potential side effects, including hot flashes, were discussed. If pain persists, a hysterectomy leaving the ovaries intact may be considered. A referral to a minimally invasive specialist in [place] for further debulking of the disease may also be contemplated. An annual exam, including a Pap smear, is scheduled for 6 months from now to monitor her response to the Prolia treatment.    Follow-up  The patient will follow up in 6 months.    PROCEDURE  The patient received a single Depo-Lupron injection in the past, which was effective in alleviating pelvic pain. She also has a history of Mirena IUD use for 8 years, which was initially effective until it became embedded.       Counseling: ENDOMETRIOSIS- Discussed Dx, incidence, familial tendency, recurrence, periods/pain/dyspareunia, pregnancy, risk of other pain syndromes. Tx options wrt pt hx NLT expectant, hormonal (BC, IUD, Lupron, Orilissa, others), outpt dx L/S, hyst +/- BSO.        Follow Up:  Return in about 6 months (around 8/10/2025) for Annual physical.          Sandrine Iraheta MD  02/10/2025    Patient or patient representative verbalized consent for the use of Ambient Listening during the visit with  Sandrine Iraheta MD for chart documentation. 2/11/2025  12:06 EST

## 2025-02-10 ENCOUNTER — OFFICE VISIT (OUTPATIENT)
Dept: OBSTETRICS AND GYNECOLOGY | Facility: CLINIC | Age: 30
End: 2025-02-10
Payer: MEDICAID

## 2025-02-10 VITALS
SYSTOLIC BLOOD PRESSURE: 117 MMHG | DIASTOLIC BLOOD PRESSURE: 80 MMHG | WEIGHT: 206 LBS | HEART RATE: 105 BPM | BODY MASS INDEX: 36.49 KG/M2

## 2025-02-10 DIAGNOSIS — N80.30 ENDOMETRIOSIS OF PERITONEUM: ICD-10-CM

## 2025-02-10 DIAGNOSIS — N80.30 ENDOMETRIOSIS OF PERITONEUM: Primary | ICD-10-CM

## 2025-02-10 DIAGNOSIS — M62.89 HIGH-TONE PELVIC FLOOR DYSFUNCTION: ICD-10-CM

## 2025-02-10 RX ORDER — ELAGOLIX 150 MG/1
150 TABLET, FILM COATED ORAL DAILY
Qty: 30 TABLET | Refills: 11 | Status: SHIPPED | OUTPATIENT
Start: 2025-02-10

## 2025-02-10 RX ORDER — DEXTROAMPHETAMINE SACCHARATE, AMPHETAMINE ASPARTATE, DEXTROAMPHETAMINE SULFATE AND AMPHETAMINE SULFATE 5; 5; 5; 5 MG/1; MG/1; MG/1; MG/1
1 TABLET ORAL DAILY
COMMUNITY
End: 2025-02-10

## 2025-02-10 RX ORDER — CELECOXIB 100 MG/1
CAPSULE ORAL
COMMUNITY
Start: 2025-02-03

## 2025-02-11 RX ORDER — PROGESTERONE 200 MG/1
200 CAPSULE ORAL DAILY
Qty: 90 CAPSULE | Refills: 1 | OUTPATIENT
Start: 2025-02-11

## 2025-02-11 NOTE — TELEPHONE ENCOUNTER
Pharmacy requesting refill on Prometrium.. Per pharmacy 2/3/25 Discontinued by Lakeshia Cam therapy complete.  Last seen 2/10/25.  Next appointment 8/11/25.  Last filled 10/2/24 Prometrium # 90 with 1 refill.

## 2025-04-15 ENCOUNTER — OFFICE VISIT (OUTPATIENT)
Dept: INTERNAL MEDICINE | Age: 30
End: 2025-04-15
Payer: MEDICAID

## 2025-04-15 VITALS
BODY MASS INDEX: 35.97 KG/M2 | HEART RATE: 88 BPM | HEIGHT: 63 IN | RESPIRATION RATE: 16 BRPM | TEMPERATURE: 98.1 F | DIASTOLIC BLOOD PRESSURE: 80 MMHG | OXYGEN SATURATION: 92 % | WEIGHT: 203 LBS | SYSTOLIC BLOOD PRESSURE: 120 MMHG

## 2025-04-15 DIAGNOSIS — J40 BRONCHITIS: Primary | ICD-10-CM

## 2025-04-15 DIAGNOSIS — J02.9 SORE THROAT: ICD-10-CM

## 2025-04-15 DIAGNOSIS — R09.81 NASAL CONGESTION: ICD-10-CM

## 2025-04-15 PROCEDURE — 87880 STREP A ASSAY W/OPTIC: CPT | Performed by: INTERNAL MEDICINE

## 2025-04-15 PROCEDURE — 99213 OFFICE O/P EST LOW 20 MIN: CPT | Performed by: INTERNAL MEDICINE

## 2025-04-15 PROCEDURE — 87428 SARSCOV & INF VIR A&B AG IA: CPT | Performed by: INTERNAL MEDICINE

## 2025-04-15 PROCEDURE — 1125F AMNT PAIN NOTED PAIN PRSNT: CPT | Performed by: INTERNAL MEDICINE

## 2025-04-15 RX ORDER — BENZONATATE 100 MG/1
100 CAPSULE ORAL 3 TIMES DAILY PRN
Qty: 30 CAPSULE | Refills: 0 | Status: SHIPPED | OUTPATIENT
Start: 2025-04-15

## 2025-04-15 RX ORDER — AZITHROMYCIN 250 MG/1
TABLET, FILM COATED ORAL
Qty: 6 TABLET | Refills: 0 | Status: SHIPPED | OUTPATIENT
Start: 2025-04-15

## 2025-04-15 NOTE — PROGRESS NOTES
CHIEF COMPLAINT  Stephanie Slaughter presents to [facility] for follow-up of Nasal Congestion (s), Sore Throat, and Chills.    HPI  History of Present Illness  The patient is a 30-year-old female who presents with complaints of congestion, sore throat, and chills.    She reports experiencing significant coughing, congestion, rhinorrhea, and postnasal drainage since Saturday. She has been expectorating green phlegm and reports that the cough is severe enough to disrupt her sleep. She also notes mild discomfort from the postnasal drainage. She has previously been treated with Zithromax. She does not smoke and reports no exposure to secondhand smoke at home. She has been experiencing mild chills and fatigue but reports no febrile episodes. She confirms that she is not currently pregnant.  Cough with green sputum      SOCIAL HISTORY  She does not smoke.  Pt of [facility]    Here at clinic patient was negative for COVID flu A and B and strep.    Records reviewed, meds reviewed in detail, labs reviewed with patient.  Plan of care is as follows below.    PMFSH-Reviewed  ROS-sore throat, cough      Current Outpatient Medications:     Adderall XR 30 MG 24 hr capsule, Take 1 capsule by mouth Daily (Patient taking differently: Take 1 capsule by mouth Daily Morning only), Disp: , Rfl:     ALPRAZolam (XANAX) 1 MG tablet, Take 1 tablet by mouth Every 12 (Twelve) Hours., Disp: , Rfl:     amphetamine-dextroamphetamine (ADDERALL) 20 MG tablet, TAKE 1 TABLET BY MOUTH EVERY AFTERNOON, Disp: , Rfl:     aspirin 81 MG chewable tablet, Chew 1 tablet Daily., Disp: , Rfl:     celecoxib (CeleBREX) 100 MG capsule, TAKE 1 CAPSULE BY MOUTH 2 (TWO) TIMES A DAY AS NEEDED FOR MILD PAIN., Disp: , Rfl:     cholecalciferol (VITAMIN D3) 250 MCG (49118 UT) capsule, Take 1 capsule by mouth Daily., Disp: 90 capsule, Rfl: 3    cyclobenzaprine (FLEXERIL) 10 MG tablet, Take 1 tablet by mouth 3 (Three) Times a Day As  "Needed for Muscle Spasms., Disp: 21 tablet, Rfl: 5    dicyclomine (BENTYL) 10 MG capsule, Take 1 capsule by mouth As Needed., Disp: , Rfl:     Elagolix Sodium (Orilissa) 150 MG tablet, Take 150 mg by mouth Daily., Disp: 30 tablet, Rfl: 11    ibuprofen (ADVIL,MOTRIN) 600 MG tablet, Take 1 tablet by mouth 3 (Three) Times a Day As Needed for Mild Pain or Moderate Pain., Disp: 30 tablet, Rfl: 1    mirtazapine (REMERON) 7.5 MG tablet, TAKE 1-2 TABLET BY MOUTH AT BEDTIME, Disp: , Rfl:     naloxone (NARCAN) 4 MG/0.1ML nasal spray, Call 911. Don't prime. [place] in 1 nostril for overdose. Repeat in 2-3 minutes in other nostril if no or minimal breathing/responsiveness., Disp: 2 each, Rfl: 0    ondansetron (Zofran) 4 MG tablet, Take 1 tablet by mouth Every 8 (Eight) Hours As Needed for Nausea or Vomiting., Disp: 20 tablet, Rfl: 0    polyethylene glycol (MIRALAX) 17 GM/SCOOP powder, Take 17 g by mouth Daily. (Patient taking differently: Take 17 g by mouth As Needed.), Disp: 116 g, Rfl: 1    Vraylar 1.5 MG capsule capsule, Take 1 capsule by mouth Daily., Disp: , Rfl:     azithromycin (Zithromax Z-Reese) 250 MG tablet, Take 2 tablets by mouth on day 1, then 1 tablet daily on days 2-5, Disp: 6 tablet, Rfl: 0    benzonatate (Tessalon Perles) 100 MG capsule, Take 1 capsule by mouth 3 (Three) Times a Day As Needed for Cough., Disp: 30 capsule, Rfl: 0   PFSH reviewed.      OBJECTIVE  Vital Signs  Vitals:    04/15/25 1540   BP: 120/80   Pulse: 88   Resp: 16   Temp: 98.1 °F (36.7 °C)   TempSrc: Temporal   SpO2: 92%   Weight: 92.1 kg (203 lb)   Height: 160 cm (62.99\")      Body mass index is 35.97 kg/m².    Physical Exam  Both ears show no signs of infection or wax buildup. The mouth appears slightly red and erythematous, but no pus is observed. There is no tenderness in the maxillary sinuses. No submandibular lymph nodes are detected.  No fluid or wheezing is heard in the lungs.  The heart has a regular rate and rhythm. No irregularities " are detected.  There is no edema in the lower extremities.      Physical Exam  Vitals and nursing note reviewed.   Constitutional:       Appearance: Normal appearance.   HENT:      Head: Normocephalic and atraumatic.   Cardiovascular:      Rate and Rhythm: Normal rate and regular rhythm.   Pulmonary:      Effort: Pulmonary effort is normal.      Breath sounds: Normal breath sounds.   Abdominal:      Palpations: Abdomen is soft.   Musculoskeletal:      Cervical back: Normal range of motion and neck supple.   Neurological:      General: No focal deficit present.      Mental Status: She is alert and oriented to person, place, and time.          RESULTS REVIEW  Lab Results   Component Value Date    BNP 15.2 03/26/2024     CMP          1/3/2025    14:56   CMP   Glucose 81    BUN 16    Creatinine 0.81    EGFR 101.5    Sodium 140    Potassium 4.4    Chloride 104    Calcium 10.0    Total Protein 7.6    Albumin 4.7    Globulin 2.9    Total Bilirubin 0.9    Alkaline Phosphatase 85    AST (SGOT) 25    ALT (SGPT) 29    Albumin/Globulin Ratio 1.6    BUN/Creatinine Ratio 19.8    Anion Gap 9.8      CBC w/diff          6/7/2024    08:02 9/5/2024    07:10 1/3/2025    14:56   CBC w/Diff   WBC 5.56     5.85  6.49    RBC 4.36     4.30  4.84    Hemoglobin 13.3     13.0  14.3    Hematocrit 40.0     39.0  42.6    MCV 91.7     90.7  88.0    MCH 30.5     30.2  29.5    MCHC 33.3     33.3  33.6    RDW 12.3     13.0  13.0    Platelets 168     161  196    Neutrophil Rel %  52.2  58.8    Immature Granulocyte Rel %  0.2  0.3    Lymphocyte Rel %  33.0  27.3    Monocyte Rel %  9.6  8.6    Eosinophil Rel %  4.1  3.9    Basophil Rel %  0.9  1.1       Details          This result is from an external source.              Lipid Panel          1/3/2025    14:56   Lipid Panel   Total Cholesterol 156    Triglycerides 141    HDL Cholesterol 45    VLDL Cholesterol 25    LDL Cholesterol  86    LDL/HDL Ratio 1.84       Lab Results   Component Value Date    TSH  2.920 01/03/2025      Lab Results   Component Value Date    FREET4 1.00 08/26/2022         Lab Results   Component Value Date    CKSM59VF 25.0 (L) 01/03/2025    MG 1.6 11/08/2021        XR Knee 3 View Right  Result Date: 1/6/2025  Impression: No acute osseous abnormality Electronically Signed: Castro Woods MD  1/6/2025 2:11 PM EST  Workstation ID: [id number]    XR Spine Lumbar Complete 4+VW  Result Date: 1/6/2025  Negative lumbar series. Electronically Signed: Kimani Huizar MD  1/6/2025 1:39 PM EST  Workstation ID: [id number]          Results  Laboratory Studies  Initial test is negative for strep, COVID-19, influenza A and B.             ASSESSMENT & PLAN  Diagnoses and all orders for this visit:    1. Bronchitis (Primary)  -     azithromycin (Zithromax Z-Reese) 250 MG tablet; Take 2 tablets by mouth on day 1, then 1 tablet daily on days 2-5  Dispense: 6 tablet; Refill: 0  -     benzonatate (Tessalon Perles) 100 MG capsule; Take 1 capsule by mouth 3 (Three) Times a Day As Needed for Cough.  Dispense: 30 capsule; Refill: 0    2. Sore throat  -     POCT rapid strep A    3. Nasal congestion  -     POCT SARS-CoV-2 Antigen VINAYAK + Flu           Assessment & Plan  1. Bronchitis.  Initial tests are negative for strep, COVID-19, influenza A and B. A prescription for Zithromax (Z-Reese) has been issued, with a dosage regimen of 2 tablets initially, followed by 1 tablet daily for the subsequent 4 days. Potential side effects, including stomach pain and nausea, were discussed. Additionally, Tessalon Perles has been prescribed, to be taken as needed at a dosage of 1 tablet three times daily. She has been advised to utilize Flonase nasal spray, which she already possesses at home. If there is no improvement within a week, a follow-up consultation will be necessary.              There are no Patient Instructions on file for this visit.     Patient or patient representative verbalized consent for the use of Ambient Listening during  the visit with  Sola Eastman MD for chart documentation. 4/15/2025  16:15 EDT    FOLLOW UP  Return if symptoms worsen or fail to improve, for Recheck, Next scheduled follow up.    Patient was given instructions and counseling regarding her condition or for health maintenance advice. Please see specific information pulled into the AVS if appropriate.

## 2025-08-11 ENCOUNTER — OFFICE VISIT (OUTPATIENT)
Dept: OBSTETRICS AND GYNECOLOGY | Age: 30
End: 2025-08-11
Payer: MEDICAID

## 2025-08-11 VITALS
BODY MASS INDEX: 38.28 KG/M2 | SYSTOLIC BLOOD PRESSURE: 125 MMHG | WEIGHT: 208 LBS | DIASTOLIC BLOOD PRESSURE: 81 MMHG | HEART RATE: 94 BPM | HEIGHT: 62 IN

## 2025-08-11 DIAGNOSIS — N94.10 FEMALE DYSPAREUNIA: ICD-10-CM

## 2025-08-11 DIAGNOSIS — Z01.419 WELL WOMAN EXAM WITH ROUTINE GYNECOLOGICAL EXAM: Primary | ICD-10-CM

## 2025-08-11 DIAGNOSIS — N87.0 DYSPLASIA OF CERVIX, LOW GRADE (CIN 1): ICD-10-CM

## 2025-08-11 DIAGNOSIS — N80.30 ENDOMETRIOSIS OF PERITONEUM: ICD-10-CM

## 2025-08-11 DIAGNOSIS — Z11.3 SCREEN FOR STD (SEXUALLY TRANSMITTED DISEASE): ICD-10-CM

## 2025-08-11 DIAGNOSIS — R10.2 PELVIC PAIN: ICD-10-CM

## 2025-08-11 LAB
C TRACH DNA SPEC QL NAA+PROBE: NOT DETECTED
N GONORRHOEA RRNA SPEC QL NAA+PROBE: NOT DETECTED

## 2025-08-11 PROCEDURE — 1159F MED LIST DOCD IN RCRD: CPT | Performed by: OBSTETRICS & GYNECOLOGY

## 2025-08-11 PROCEDURE — 99213 OFFICE O/P EST LOW 20 MIN: CPT | Performed by: OBSTETRICS & GYNECOLOGY

## 2025-08-11 PROCEDURE — 2014F MENTAL STATUS ASSESS: CPT | Performed by: OBSTETRICS & GYNECOLOGY

## 2025-08-11 PROCEDURE — G0123 SCREEN CERV/VAG THIN LAYER: HCPCS | Performed by: OBSTETRICS & GYNECOLOGY

## 2025-08-11 PROCEDURE — 87624 HPV HI-RISK TYP POOLED RSLT: CPT | Performed by: OBSTETRICS & GYNECOLOGY

## 2025-08-11 PROCEDURE — 87491 CHLMYD TRACH DNA AMP PROBE: CPT | Performed by: OBSTETRICS & GYNECOLOGY

## 2025-08-11 PROCEDURE — 99395 PREV VISIT EST AGE 18-39: CPT | Performed by: OBSTETRICS & GYNECOLOGY

## 2025-08-11 PROCEDURE — 87591 N.GONORRHOEAE DNA AMP PROB: CPT | Performed by: OBSTETRICS & GYNECOLOGY

## 2025-08-11 PROCEDURE — 1160F RVW MEDS BY RX/DR IN RCRD: CPT | Performed by: OBSTETRICS & GYNECOLOGY

## 2025-08-11 RX ORDER — CELECOXIB 100 MG/1
100 CAPSULE ORAL 2 TIMES DAILY PRN
Qty: 20 CAPSULE | Refills: 6 | Status: SHIPPED | OUTPATIENT
Start: 2025-08-11

## 2025-08-11 RX ORDER — ELAGOLIX 150 MG/1
150 TABLET, FILM COATED ORAL DAILY
Qty: 30 TABLET | Refills: 11 | Status: SHIPPED | OUTPATIENT
Start: 2025-08-11

## 2025-08-14 ENCOUNTER — TELEPHONE (OUTPATIENT)
Dept: OBSTETRICS AND GYNECOLOGY | Age: 30
End: 2025-08-14
Payer: MEDICAID

## 2025-08-14 LAB
CYTOLOGIST CVX/VAG CYTO: ABNORMAL
CYTOLOGY CVX/VAG DOC CYTO: ABNORMAL
CYTOLOGY CVX/VAG DOC THIN PREP: ABNORMAL
DX ICD CODE: ABNORMAL
DX ICD CODE: ABNORMAL
HPV I/H RISK 4 DNA CVX QL PROBE+SIG AMP: POSITIVE
OTHER STN SPEC: ABNORMAL
PATHOLOGIST CVX/VAG CYTO: ABNORMAL
RECOM F/U CVX/VAG CYTO: ABNORMAL
SERVICE CMNT-IMP: ABNORMAL
STAT OF ADQ CVX/VAG CYTO-IMP: ABNORMAL

## 2025-08-26 ENCOUNTER — TELEPHONE (OUTPATIENT)
Dept: OBSTETRICS AND GYNECOLOGY | Age: 30
End: 2025-08-26
Payer: MEDICAID

## (undated) DEVICE — ADHS LIQ MASTISOL 2/3ML

## (undated) DEVICE — SHEET,DRAPE,70X85,STERILE: Brand: MEDLINE

## (undated) DEVICE — TOWEL,OR,DSP,ST,BLUE,STD,4/PK,20PK/CS: Brand: MEDLINE

## (undated) DEVICE — COVADERM PLUS: Brand: DEROYAL

## (undated) DEVICE — SLV SCD KN/LEN ADJ EXPRSS BLENDED MD 1P/U

## (undated) DEVICE — KIT,ANTI FOG,W/SPONGE & FLUID,SOFT PACK: Brand: MEDLINE

## (undated) DEVICE — INTENDED FOR TISSUE SEPARATION, AND OTHER PROCEDURES THAT REQUIRE A SHARP SURGICAL BLADE TO PUNCTURE OR CUT.: Brand: BARD-PARKER ® CARBON RIB-BACK BLADES

## (undated) DEVICE — SOL IRR NACL 0.9PCT 1000ML

## (undated) DEVICE — Device

## (undated) DEVICE — GLV SURG SENSICARE PI ORTHO SZ6.5 LF STRL

## (undated) DEVICE — SUT MNCRYL PLS ANTIB UD 4/0 PS2 18IN

## (undated) DEVICE — GLOVE,SURG,SENSICARE SLT,LF,PF,7: Brand: MEDLINE

## (undated) DEVICE — PAD GRND REM POLYHESIVE A/ DISP

## (undated) DEVICE — LAPAROSCOPIC TROCAR SLEEVE/SINGLE USE: Brand: KII® OPTICAL ACCESS SYSTEM

## (undated) DEVICE — TOTAL TRAY, 16FR 10ML SIL FOLEY, URN: Brand: MEDLINE

## (undated) DEVICE — HYPODERMIC SAFETY NEEDLE: Brand: MONOJECT

## (undated) DEVICE — PCH SURG INST LAP 2 LF

## (undated) DEVICE — PREP TRAY WITH CHG: Brand: MEDLINE INDUSTRIES, INC.

## (undated) DEVICE — LITHOTOMY-YELLOW FINS: Brand: MEDLINE INDUSTRIES, INC.

## (undated) DEVICE — DUAL LUMEN STOMACH TUBE: Brand: SALEM SUMP

## (undated) DEVICE — SYR LL TP 10ML STRL

## (undated) DEVICE — GLV SURG SENSICARE PI ORTHO PF SZ7 LF STRL

## (undated) DEVICE — STRIP,CLOSURE,WOUND,MEDI-STRIP,1/2X4: Brand: MEDLINE

## (undated) DEVICE — 40585 XL ADVANCED TRENDELENBURG POSITIONING KIT: Brand: 40585 XL ADVANCED TRENDELENBURG POSITIONING KIT

## (undated) DEVICE — TROCAR: Brand: KII® SLEEVE

## (undated) DEVICE — INSUFFLATION NEEDLE TO ESTABLISH PNEUMOPERITONEUM.: Brand: INSUFFLATION NEEDLE

## (undated) DEVICE — GLOVE,SURG,SENSICARE SLT,LF,PF,7.5: Brand: MEDLINE

## (undated) DEVICE — ENDOPATH XCEL WITH OPTIVIEW TECHNOLOGY BLADELESS TROCARS WITH STABILITY SLEEVES: Brand: ENDOPATH XCEL OPTIVIEW